# Patient Record
Sex: FEMALE | Race: WHITE | NOT HISPANIC OR LATINO | Employment: OTHER | URBAN - METROPOLITAN AREA
[De-identification: names, ages, dates, MRNs, and addresses within clinical notes are randomized per-mention and may not be internally consistent; named-entity substitution may affect disease eponyms.]

---

## 2017-03-02 DIAGNOSIS — E03.8 OTHER SPECIFIED HYPOTHYROIDISM: ICD-10-CM

## 2017-03-02 DIAGNOSIS — I10 HYPERTENSION GOAL BP (BLOOD PRESSURE) < 140/80: ICD-10-CM

## 2017-03-02 NOTE — TELEPHONE ENCOUNTER
lisinopril      Last Written Prescription Date: 6/20/16  Last Fill Quantity: 90, # refills: 2  Last Office Visit with [facility] or  Health prescribing provider: 8/25/16  Next 5 appointments (look out 90 days)     Apr 14, 2017  2:00 PM CDT   SHORT with Twin Aldridge MD   [facility]    [address]  [facility] [address]   [phone]                   Potassium   Date Value Ref Range Status   08/25/2016 4.0 3.4 - 5.3 mmol/L Final     Creatinine   Date Value Ref Range Status   08/25/2016 0.55 0.52 - 1.04 mg/dL Final     BP Readings from Last 3 Encounters:   08/25/16 114/64   05/27/16 100/62   10/30/15 102/60     verapamil         Last Written Prescription Date: 6/20/16  Last Fill Quantity: 90, # refills: 2    Last Office Visit with [facility], [facility] or  Health prescribing provider:  8/25/16   Future Office Visit:    Next 5 appointments (look out 90 days)     Apr 14, 2017  2:00 PM CDT   SHORT with Twin Aldridge MD   [facility]    [address]  [facility] [address]   [phone]                  BP Readings from Last 3 Encounters:   08/25/16 114/64   05/27/16 100/62   10/30/15 102/60     Lab Results   Component Value Date    ALT 25 08/25/2016     Lab Results   Component Value Date    CHOL 167 08/25/2016     Lab Results   Component Value Date    HDL 59 08/25/2016     Lab Results   Component Value Date    LDL 84 08/25/2016     Lab Results   Component Value Date    TRIG 119 08/25/2016     Lab Results   Component Value Date    CHOLHDLRATIO 2.9 07/09/2015       Labs showing if normal/abnormal  Lab Results   Component Value Date    ALT 25 08/25/2016     Lab Results   Component Value Date    CHOL 167 08/25/2016    TRIG 119 08/25/2016    HDL 59 08/25/2016    LDL 84 08/25/2016    VLDL 26 07/09/2015    CHOLHDLRATIO 2.9 07/09/2015     levo     Last Written Prescription Date: 6/20/16  Last Quantity: 90, # refills:  2  Last Office Visit with [facility], [facility] or [facility] prescribing provider: 8/25/16   Next 5 appointments (look out 90 days)     Apr 14, 2017  2:00 PM CDT   SHORT with Twin Aldridge MD   [facility] ([facility]    [address]  [facility] [address]   [phone]                   TSH   Date Value Ref Range Status   08/25/2016 0.65 0.40 - 4.00 mU/L Final

## 2017-03-07 ENCOUNTER — DOCUMENTATION ONLY (OUTPATIENT)
Dept: LAB | Facility: CLINIC | Age: 67
End: 2017-03-07

## 2017-03-07 DIAGNOSIS — E03.4 HYPOTHYROIDISM DUE TO ACQUIRED ATROPHY OF THYROID: ICD-10-CM

## 2017-03-07 DIAGNOSIS — I10 ESSENTIAL HYPERTENSION: ICD-10-CM

## 2017-03-07 DIAGNOSIS — Z13.6 CARDIOVASCULAR SCREENING; LDL GOAL LESS THAN 130: Primary | ICD-10-CM

## 2017-03-07 RX ORDER — LISINOPRIL 10 MG/1
10 TABLET ORAL DAILY
Qty: 90 TABLET | Refills: 1 | Status: SHIPPED | OUTPATIENT
Start: 2017-03-07 | End: 2017-04-14

## 2017-03-07 RX ORDER — LEVOTHYROXINE SODIUM 137 UG/1
137 TABLET ORAL DAILY
Qty: 90 TABLET | Refills: 1 | Status: SHIPPED | OUTPATIENT
Start: 2017-03-07 | End: 2017-04-14

## 2017-03-07 RX ORDER — VERAPAMIL HYDROCHLORIDE 240 MG/1
240 TABLET, FILM COATED, EXTENDED RELEASE ORAL DAILY
Qty: 90 TABLET | Refills: 1 | Status: SHIPPED | OUTPATIENT
Start: 2017-03-07 | End: 2017-04-14

## 2017-03-07 NOTE — TELEPHONE ENCOUNTER
Prescription approved per [facility] Refill Protocol.  Rosa Adkins RN

## 2017-04-07 DIAGNOSIS — E03.4 HYPOTHYROIDISM DUE TO ACQUIRED ATROPHY OF THYROID: ICD-10-CM

## 2017-04-07 DIAGNOSIS — I10 ESSENTIAL HYPERTENSION: ICD-10-CM

## 2017-04-07 DIAGNOSIS — Z13.6 CARDIOVASCULAR SCREENING; LDL GOAL LESS THAN 130: ICD-10-CM

## 2017-04-07 PROCEDURE — 80053 COMPREHEN METABOLIC PANEL: CPT | Performed by: INTERNAL MEDICINE

## 2017-04-07 PROCEDURE — 36415 COLL VENOUS BLD VENIPUNCTURE: CPT | Performed by: INTERNAL MEDICINE

## 2017-04-07 PROCEDURE — 84443 ASSAY THYROID STIM HORMONE: CPT | Performed by: INTERNAL MEDICINE

## 2017-04-07 PROCEDURE — 80061 LIPID PANEL: CPT | Performed by: INTERNAL MEDICINE

## 2017-04-08 LAB
ALBUMIN SERPL-MCNC: 3.5 G/DL (ref 3.4–5)
ALP SERPL-CCNC: 97 U/L (ref 40–150)
ALT SERPL W P-5'-P-CCNC: 31 U/L (ref 0–50)
ANION GAP SERPL CALCULATED.3IONS-SCNC: 6 MMOL/L (ref 3–14)
AST SERPL W P-5'-P-CCNC: 10 U/L (ref 0–45)
BILIRUB SERPL-MCNC: 0.4 MG/DL (ref 0.2–1.3)
BUN SERPL-MCNC: 20 MG/DL (ref 7–30)
CALCIUM SERPL-MCNC: 8.8 MG/DL (ref 8.5–10.1)
CHLORIDE SERPL-SCNC: 105 MMOL/L (ref 94–109)
CHOLEST SERPL-MCNC: 188 MG/DL
CO2 SERPL-SCNC: 29 MMOL/L (ref 20–32)
CREAT SERPL-MCNC: 0.68 MG/DL (ref 0.52–1.04)
GFR SERPL CREATININE-BSD FRML MDRD: 87 ML/MIN/1.7M2
GLUCOSE SERPL-MCNC: 89 MG/DL (ref 70–99)
HDLC SERPL-MCNC: 66 MG/DL
LDLC SERPL CALC-MCNC: 103 MG/DL
NONHDLC SERPL-MCNC: 122 MG/DL
POTASSIUM SERPL-SCNC: 4.2 MMOL/L (ref 3.4–5.3)
PROT SERPL-MCNC: 6.9 G/DL (ref 6.8–8.8)
SODIUM SERPL-SCNC: 140 MMOL/L (ref 133–144)
TRIGL SERPL-MCNC: 93 MG/DL
TSH SERPL DL<=0.005 MIU/L-ACNC: 1.3 MU/L (ref 0.4–4)

## 2017-04-14 ENCOUNTER — OFFICE VISIT (OUTPATIENT)
Dept: INTERNAL MEDICINE | Facility: CLINIC | Age: 67
End: 2017-04-14
Payer: COMMERCIAL

## 2017-04-14 VITALS
SYSTOLIC BLOOD PRESSURE: 110 MMHG | RESPIRATION RATE: 12 BRPM | DIASTOLIC BLOOD PRESSURE: 60 MMHG | HEART RATE: 80 BPM | HEIGHT: 64 IN | OXYGEN SATURATION: 97 % | TEMPERATURE: 97.6 F | BODY MASS INDEX: 28 KG/M2 | WEIGHT: 164 LBS

## 2017-04-14 DIAGNOSIS — G43.809 MIGRAINE VARIANT: ICD-10-CM

## 2017-04-14 DIAGNOSIS — I10 HYPERTENSION GOAL BP (BLOOD PRESSURE) < 140/80: Primary | ICD-10-CM

## 2017-04-14 DIAGNOSIS — Z80.0 FAMILY HISTORY OF COLON CANCER: ICD-10-CM

## 2017-04-14 DIAGNOSIS — E03.8 OTHER SPECIFIED HYPOTHYROIDISM: ICD-10-CM

## 2017-04-14 DIAGNOSIS — G25.81 RESTLESS LEG SYNDROME: ICD-10-CM

## 2017-04-14 PROCEDURE — 99214 OFFICE O/P EST MOD 30 MIN: CPT | Performed by: INTERNAL MEDICINE

## 2017-04-14 RX ORDER — LEVOTHYROXINE SODIUM 137 UG/1
137 TABLET ORAL DAILY
Qty: 90 TABLET | Refills: 3 | Status: SHIPPED | OUTPATIENT
Start: 2017-04-14 | End: 2017-08-24

## 2017-04-14 RX ORDER — VERAPAMIL HYDROCHLORIDE 240 MG/1
240 TABLET, FILM COATED, EXTENDED RELEASE ORAL DAILY
Qty: 90 TABLET | Refills: 3 | Status: SHIPPED | OUTPATIENT
Start: 2017-04-14 | End: 2017-08-24

## 2017-04-14 RX ORDER — LISINOPRIL 10 MG/1
10 TABLET ORAL DAILY
Qty: 90 TABLET | Refills: 3 | Status: SHIPPED | OUTPATIENT
Start: 2017-04-14 | End: 2017-08-24

## 2017-04-14 ASSESSMENT — ANXIETY QUESTIONNAIRES
3. WORRYING TOO MUCH ABOUT DIFFERENT THINGS: NOT AT ALL
2. NOT BEING ABLE TO STOP OR CONTROL WORRYING: NOT AT ALL
IF YOU CHECKED OFF ANY PROBLEMS ON THIS QUESTIONNAIRE, HOW DIFFICULT HAVE THESE PROBLEMS MADE IT FOR YOU TO DO YOUR WORK, TAKE CARE OF THINGS AT HOME, OR GET ALONG WITH OTHER PEOPLE: NOT DIFFICULT AT ALL
7. FEELING AFRAID AS IF SOMETHING AWFUL MIGHT HAPPEN: NOT AT ALL
GAD7 TOTAL SCORE: 0
5. BEING SO RESTLESS THAT IT IS HARD TO SIT STILL: NOT AT ALL
1. FEELING NERVOUS, ANXIOUS, OR ON EDGE: NOT AT ALL
6. BECOMING EASILY ANNOYED OR IRRITABLE: NOT AT ALL

## 2017-04-14 ASSESSMENT — PATIENT HEALTH QUESTIONNAIRE - PHQ9: 5. POOR APPETITE OR OVEREATING: NOT AT ALL

## 2017-04-14 NOTE — MR AVS SNAPSHOT
After Visit Summary   4/14/2017    China Patterson    MRN: [id number]           Patient Information     Date Of Birth          1950        Visit Information        Provider Department      4/14/2017 2:00 PM Twin Aldridge MD [facility]        Today's Diagnoses     Hypertension goal BP (blood pressure) < 140/80    -  1    Other specified hypothyroidism        Family history of colon cancer        Restless leg syndrome        Migraine variant          Care Instructions    Recent labs all look fine.     Refills of blood pressure and thyroid meds have been sent to [facility] for a year.     Someone will contact you to schedule a colonoscopy.     See Nydia Jane for your annual visit with her.     See me in a year.             Follow-ups after your visit        Additional Services     GASTROENTEROLOGY ADULT REF PROCEDURE ONLY       Last Lab Result: Creatinine (mg/dL)       Date                     Value                 04/07/2017               0.68             ----------  Body mass index is 28.6 kg/(m^2).      Patient will be contacted to schedule procedure.     Please be aware that coverage of these services is subject to the terms and limitations of your health insurance plan.  Call member services at your health plan with any benefit or coverage questions.  Any procedures must be performed at a [facility] facility OR coordinated by your clinic's referral office.    Please bring the following with you to your appointment:    (1) Any X-Rays, CTs or MRIs which have been performed.  Contact the facility where they were done to arrange for  prior to your scheduled appointment.    (2) List of current medications   (3) This referral request   (4) Any documents/labs given to you for this referral                  Who to contact     If you have questions or need follow up information about today's clinic visit or your schedule please contact [facility] directly at  "[phone].  Normal or non-critical lab and imaging results will be communicated to you by [email or web address], letter or phone within 4 business days after the clinic has received the results. If you do not hear from us within 7 days, please contact the clinic through [email or web address] or phone. If you have a critical or abnormal lab result, we will notify you by phone as soon as possible.  Submit refill requests through [facility] or call your pharmacy and they will forward the refill request to us. Please allow 3 business days for your refill to be completed.          Additional Information About Your Visit        [email or web address] Information     [facility] lets you send messages to your doctor, view your test results, renew your prescriptions, schedule appointments and more. To sign up, go to [email or web address] . Click on \"Log in\" on the left side of the screen, which will take you to the Welcome page. Then click on \"Sign up Now\" on the right side of the page.     You will be asked to enter the access code listed below, as well as some personal information. Please follow the directions to create your username and password.     Your access code is: [email or web address]  Expires: 2017  2:41 PM     Your access code will  in 90 days. If you need help or a new code, please call your [place] clinic or [phone].        Care EveryWhere ID     This is your Care EveryWhere ID. This could be used by other organizations to access your [place] medical records  [id number]        Your Vitals Were     Pulse Temperature Respirations Height Last Period Pulse Oximetry    80 97.6  F (36.4  C) (Oral) 12 5' 3.5\" (1.613 m) 2003 97%    Breastfeeding? BMI (Body Mass Index)                No 28.6 kg/m2           Blood Pressure from Last 3 Encounters:   17 110/60   16 114/64   16 100/62    Weight from Last 3 Encounters:   17 164 lb (74.4 kg)   16 158 lb (71.7 kg)   16 157 lb 6.4 oz (71.4 kg)              We " Performed the Following     GASTROENTEROLOGY ADULT REF PROCEDURE ONLY          Where to get your medicines      These medications were sent to [id number] - [place], [place] - [address]  [address], [place] [address]     Phone:  [phone]     levothyroxine 137 MCG tablet    lisinopril 10 MG tablet    verapamil 240 MG CR tablet          Primary Care Provider Office Phone # Fax #    Twin Aldridge [phone]       [facility] [address]  [facility] [address]        Thank you!     Thank you for choosing [facility]  for your care. Our goal is always to provide you with excellent care. Hearing back from our patients is one way we can continue to improve our services. Please take a few minutes to complete the written survey that you may receive in the mail after your visit with us. Thank you!             Your Updated Medication List - Protect others around you: Learn how to safely use, store and throw away your medicines at www.disposemymeds.org.          This list is accurate as of: 4/14/17  2:41 PM.  Always use your most recent med list.                   Brand Name Dispense Instructions for use    aspirin 81 MG tablet     100    ONE DAILY       desonide 0.05 % cream    DESOWEN    30 g    Apply  topically 2 times daily. May be used on external ears, face and neck.       levothyroxine 137 MCG tablet    SYNTHROID/LEVOTHROID    90 tablet    Take 1 tablet (137 mcg) by mouth daily Note dosing change.       lisinopril 10 MG tablet    PRINIVIL/ZESTRIL    90 tablet    Take 1 tablet (10 mg) by mouth daily       pantoprazole 40 MG EC tablet    PROTONIX    90 tablet    Take 1 tablet (40 mg) by mouth daily Take 30-60 minutes before a meal.       pramipexole 0.125 MG tablet    MIRAPEX    120 tablet    Take 2 tablets at bedtime. May raise dose by one tab every 4-7 days if needed, until symptoms better or maximum of 4 tabs qhs.       SUMAtriptan  100 MG tablet    IMITREX    9 tablet    Take 1 tablet (100 mg) by mouth at onset of headache for migraine May repeat in 2 hours if needed.       terbinafine 250 MG tablet    lamISIL    42 tablet    Take 1 tablet (250 mg) by mouth daily       triamcinolone 0.1 % cream    KENALOG    80 g    Apply topically 2 times daily       verapamil 240 MG CR tablet    CALAN-SR    90 tablet    Take 1 tablet (240 mg) by mouth daily

## 2017-04-14 NOTE — PATIENT INSTRUCTIONS
Recent labs all look fine.     Refills of blood pressure and thyroid meds have been sent to [facility] for a year.     Someone will contact you to schedule a colonoscopy.     See Nydia Jane for your annual visit with her.     See me in a year.

## 2017-04-14 NOTE — PROGRESS NOTES
SUBJECTIVE:                                                    China Patterson is a 66 year old female who presents to clinic today for the following health issues:  Hypothyroidism, hypertension, GERD, migraine headaches, restless legs, health maintenance issues.     Patient presents to office for check up on hypothyroidism. Recent TSH in normal range. No temperature intolerance, changes with skin or hair, no changes in bowel habits.     Her BP appears satisfactorily controlled on current meds. The patient is tolerating her current medications without any adverse effects.     She uses Imitrex very infrequently for migraines.   She takes Protonix prn for heartburn. No dysphagia or odynophagia.     She does well on Mirapex which controls restless leg symptoms.     We reviewed recent labs which looked favorable.     Mammograms: Patient has annual mammograms ordered by Dr. Jane of OB-GYN.     Colonoscopy: Last procedure was in 2012. Patient is due for colonoscopy this year    Patient has no health concerns at this time.       Amount of exercise or physical activity: 6-7 days/week for an average of 30-45 minutes    Problems taking medications regularly: No    Medication side effects: none    Diet: regular (no restrictions)    Problem list and histories reviewed & adjusted, as indicated.  Additional history: as documented    BP Readings from Last 3 Encounters:   04/14/17 110/60   08/25/16 114/64   05/27/16 100/62    Wt Readings from Last 3 Encounters:   04/14/17 74.4 kg (164 lb)   08/25/16 71.7 kg (158 lb)   05/27/16 71.4 kg (157 lb 6.4 oz)        Labs reviewed in EPIC    Reviewed and updated as needed this visit by clinical staff  Tobacco  Allergies  Meds  Med Hx  Surg Hx  Fam Hx  Soc Hx      Reviewed and updated as needed this visit by Provider    ROS:  E: NEGATIVE for vision changes or irritation  R: NEGATIVE for significant cough or SOB   CV: NEGATIVE for chest pain, palpitations or peripheral edema   GI:  "NEGATIVE for nausea, abdominal pain, heartburn, or change in bowel habits   N: NEGATIVE for weakness, dizziness or paresthesias, abrupt changes in speech, severe and/or frequent headaches   E: NEGATIVE for temperature intolerance, skin/hair changes    OBJECTIVE:                                                    /60 (BP Location: Left arm, Patient Position: Chair, Cuff Size: Adult Large)  Pulse 80  Temp 97.6  F (36.4  C) (Oral)  Resp 12  Ht 1.613 m (5' 3.5\")  Wt 74.4 kg (164 lb)  LMP 12/01/2003  SpO2 97%  Breastfeeding? No  BMI 28.6 kg/m2  Body mass index is 28.6 kg/(m^2).  GENERAL: healthy, alert and no distress  EYES: Eyes grossly normal to inspection, PERRL and conjunctivae and sclerae normal  RESP: lungs clear to auscultation - no rales, rhonchi or wheezes  CV: regular rate and rhythm, normal S1 S2, no S3 or S4, no murmur, click or rub, no peripheral edema and peripheral pulses strong  NEURO: Normal strength and tone, mentation intact and speech normal  PSYCH: mentation appears normal, affect normal/bright         ASSESSMENT/PLAN:                                                    (I10) Hypertension goal BP (blood pressure) < 140/80  (primary encounter diagnosis)  Comment: BP at target. Continue current meds.  Plan: lisinopril (PRINIVIL/ZESTRIL) 10 MG tablet,         verapamil (CALAN-SR) 240 MG CR tablet            (E03.8) Other specified hypothyroidism  Comment: TSH normal level. Continue current meds  Plan: levothyroxine (SYNTHROID/LEVOTHROID) 137 MCG         tablet            (Z80.0) Family history of colon cancer  She is due for colonoscopy and this is ordered.   Plan: GASTROENTEROLOGY ADULT REF PROCEDURE ONLY            (G25.81) Restless leg syndrome  Comment: Continue use of medication as directed  Plan: pramipexole (MIRAPEX) .125 MG tablet    (G43.809) Migraine variant  Comment: Use medication PRN  Plan: SUMAtripan (IMITREX) 100 MG tablet    Patient Instructions   Recent labs all look fine. "     Refills of blood pressure and thyroid meds have been sent to [facility] for a year.     Someone will contact you to schedule a colonoscopy.     See Nydia Buck for your annual visit with her.     See me in a year.     Twin Aldridge MD  [facility]    This document serves as a record of the services and decisions personally performed and made by Twin Aldridge MD. It was created on their behalf by Tonya Styles, a trained medical scribe. The creation of this document is based the provider's statements to the medical scribe.  Tonya Styles April 14, 2017 2:33 PM

## 2017-04-14 NOTE — NURSING NOTE
"Chief Complaint   Patient presents with     Follow Up For     thyroid       Initial /60 (BP Location: Left arm, Patient Position: Chair, Cuff Size: Adult Large)  Pulse 80  Temp 97.6  F (36.4  C) (Oral)  Resp 12  Ht 5' 3.5\" (1.613 m)  Wt 164 lb (74.4 kg)  LMP 12/01/2003  SpO2 97%  Breastfeeding? No  BMI 28.6 kg/m2 Estimated body mass index is 28.6 kg/(m^2) as calculated from the following:    Height as of this encounter: 5' 3.5\" (1.613 m).    Weight as of this encounter: 164 lb (74.4 kg).  Medication Reconciliation: complete   Lindy ESPARZA      "

## 2017-04-14 NOTE — LETTER
"[facility]  [address] [place] [address]  [phone]    4/14/2017    China Patterson  [address]  [place] [address]           Dear Ms. Patterson,    The results of your lab tests are enclosed. Everything looks fine. Unless noted otherwise below, any results that are outside the \"normal\" range are within acceptable limits and are of no concern.    LDL= Bad Cholesterol-- the target is below 130.     HDL= Good Cholesterol-- although this is determined mostly by heredity, exercise and/or medications may sometimes raise this number.    Triglycerides are another type of fat in the blood, and can sometimes be lowered by reducing intake of sweets or excess carbohydrates, alcohol, and by weight reduction if needed.  Sometimes medications are also used.    AST and ALT are liver tests, as are the bilirubin (total and direct), albumin, total protein, and alkaline phosphatase. Yours are all normal.     Urea Nitrogen and Creatinine are kidney tests--yours are normal. GFR stands for Glomerular Filtration Rate, a more precise estimate of kidney function.    Sodium, Potassium, Chloride, Carbon Dioxide, and Calcium are all normal salts in the bloodstream. Yours all look normal. Your glucose (blood sugar) also looks fine. (You can ignore the anion gap result).    TSH measures thyroid function. Yours is normal.    If you have any further questions or problems, please contact our office.    Sincerely,      RACH ASCENCIO M.D.  Attachment: Lab results     "

## 2017-04-15 ASSESSMENT — ANXIETY QUESTIONNAIRES: GAD7 TOTAL SCORE: 0

## 2017-04-15 ASSESSMENT — PATIENT HEALTH QUESTIONNAIRE - PHQ9: SUM OF ALL RESPONSES TO PHQ QUESTIONS 1-9: 3

## 2017-07-22 ENCOUNTER — HEALTH MAINTENANCE LETTER (OUTPATIENT)
Age: 67
End: 2017-07-22

## 2017-08-08 ENCOUNTER — TELEPHONE (OUTPATIENT)
Dept: INTERNAL MEDICINE | Facility: CLINIC | Age: 67
End: 2017-08-08

## 2017-08-08 NOTE — TELEPHONE ENCOUNTER
Patient calling asking for number to call and schedule her colonoscopy. Gave number to patient to schedule with [facility]. Advised referral was placed 4/14/17 and should be good for 1 year.

## 2017-08-15 ENCOUNTER — HOSPITAL ENCOUNTER (OUTPATIENT)
Dept: MAMMOGRAPHY | Facility: CLINIC | Age: 67
Discharge: HOME | End: 2017-08-15
Attending: OBSTETRICS & GYNECOLOGY | Admitting: OBSTETRICS & GYNECOLOGY
Payer: MEDICARE

## 2017-08-15 DIAGNOSIS — Z12.31 VISIT FOR SCREENING MAMMOGRAM: ICD-10-CM

## 2017-08-15 PROCEDURE — 77063 BREAST TOMOSYNTHESIS BI: CPT

## 2017-08-15 PROCEDURE — G0202 SCR MAMMO BI INCL CAD: HCPCS

## 2017-08-17 ENCOUNTER — HOSPITAL ENCOUNTER (OUTPATIENT)
Dept: MAMMOGRAPHY | Facility: CLINIC | Age: 67
Discharge: HOME | End: 2017-08-17
Attending: OBSTETRICS & GYNECOLOGY | Admitting: OBSTETRICS & GYNECOLOGY
Payer: MEDICARE

## 2017-08-17 DIAGNOSIS — R92.8 ABNORMAL MAMMOGRAM: ICD-10-CM

## 2017-08-17 PROCEDURE — G0206 DX MAMMO INCL CAD UNI: HCPCS

## 2017-08-23 ENCOUNTER — HOSPITAL ENCOUNTER (OUTPATIENT)
Dept: MAMMOGRAPHY | Facility: CLINIC | Age: 67
End: 2017-08-23
Attending: OBSTETRICS & GYNECOLOGY
Payer: MEDICARE

## 2017-08-23 ENCOUNTER — HOSPITAL ENCOUNTER (OUTPATIENT)
Dept: MAMMOGRAPHY | Facility: CLINIC | Age: 67
Discharge: HOME | End: 2017-08-23
Attending: OBSTETRICS & GYNECOLOGY | Admitting: OBSTETRICS & GYNECOLOGY
Payer: MEDICARE

## 2017-08-23 DIAGNOSIS — R92.0 BREAST MICROCALCIFICATION, MAMMOGRAPHIC: ICD-10-CM

## 2017-08-23 PROCEDURE — 19081 BX BREAST 1ST LESION STRTCTC: CPT | Mod: LT

## 2017-08-23 PROCEDURE — 25000125 ZZHC RX 250: Performed by: OBSTETRICS & GYNECOLOGY

## 2017-08-23 PROCEDURE — 88305 TISSUE EXAM BY PATHOLOGIST: CPT | Mod: 26 | Performed by: OBSTETRICS & GYNECOLOGY

## 2017-08-23 PROCEDURE — 40000986 MA POST PROCEDURE LEFT

## 2017-08-23 PROCEDURE — 88305 TISSUE EXAM BY PATHOLOGIST: CPT | Performed by: OBSTETRICS & GYNECOLOGY

## 2017-08-23 RX ORDER — LIDOCAINE HYDROCHLORIDE AND EPINEPHRINE 10; 10 MG/ML; UG/ML
1 INJECTION, SOLUTION INFILTRATION; PERINEURAL ONCE
Status: COMPLETED | OUTPATIENT
Start: 2017-08-23 | End: 2017-08-23

## 2017-08-23 RX ADMIN — LIDOCAINE HYDROCHLORIDE 6 ML: 10 INJECTION, SOLUTION INFILTRATION; PERINEURAL at 09:15

## 2017-08-23 RX ADMIN — LIDOCAINE HYDROCHLORIDE,EPINEPHRINE BITARTRATE 12 ML: 10; .01 INJECTION, SOLUTION INFILTRATION; PERINEURAL at 09:15

## 2017-08-23 NOTE — DISCHARGE INSTRUCTIONS
After Your Breast Biopsy    Bleeding or bruising: Slight bruising is normal.  If you bleed through the bandage, put direct pressure on the breast.  If you are still bleeding after 20 minutes, call the doctor who ordered the exam.    Bandages: Keep your bandage in place until tomorrow morning.  Do not get it wet.  Leave the tape in place for two days.  On the second day, cover it with a Band-Aid.    Activity: You may shower the morning after the exam.  No heavy activity (lifting, vacuuming) for 24 hours.    Discomfort: Wear your bra overnight to support the breast.  You may take Tylenol (acetaminophen) for pain.  If you had a stereotactic of MR-directed biopsy, you may take aspirin or ibuprofen (Advil, Motrin) the morning after your biopsy, unless your doctor tells you not to.    Infection: Infection is rare.  Symptoms include fever, redness, increasing pain and fluid draining from the biopsy site.  If you have any of these symptoms, please call the doctor who ordered your exam.    Results: Results may take up to three business days.  If you have not heard your results in three days, call the Breast Center Nurse at [phone] In rare cases, we may need to do another biopsy.    Call the doctor who ordered your exam if:    You have bleeding that lasts more than 20 minutes.    You have pain that cannot be controlled.    You have signs of infection (fever, redness, drainage or other signs).    You have not had your results within three days.    Nurse navigator: Our nurse navigator is here to answer your questions and help you set up future clinic visits.  Please call [phone].    Thank you for choosing [facility].  Please call us if you have questions or concerns about your biopsy.-

## 2017-08-24 ENCOUNTER — TELEPHONE (OUTPATIENT)
Dept: MAMMOGRAPHY | Facility: CLINIC | Age: 67
End: 2017-08-24

## 2017-08-24 DIAGNOSIS — E03.8 OTHER SPECIFIED HYPOTHYROIDISM: ICD-10-CM

## 2017-08-24 DIAGNOSIS — K21.9 ESOPHAGEAL REFLUX: ICD-10-CM

## 2017-08-24 DIAGNOSIS — I10 HYPERTENSION GOAL BP (BLOOD PRESSURE) < 140/80: ICD-10-CM

## 2017-08-24 DIAGNOSIS — G43.909 MIGRAINE WITHOUT STATUS MIGRAINOSUS, NOT INTRACTABLE, UNSPECIFIED MIGRAINE TYPE: ICD-10-CM

## 2017-08-24 LAB — COPATH REPORT: NORMAL

## 2017-08-24 RX ORDER — VERAPAMIL HYDROCHLORIDE 240 MG/1
240 TABLET, FILM COATED, EXTENDED RELEASE ORAL DAILY
Qty: 90 TABLET | Refills: 0 | Status: SHIPPED | OUTPATIENT
Start: 2017-08-24

## 2017-08-24 RX ORDER — SUMATRIPTAN 100 MG/1
100 TABLET, FILM COATED ORAL
Qty: 27 TABLET | Refills: 0 | Status: SHIPPED | OUTPATIENT
Start: 2017-08-24 | End: 2017-12-03

## 2017-08-24 RX ORDER — LISINOPRIL 10 MG/1
10 TABLET ORAL DAILY
Qty: 90 TABLET | Refills: 0 | Status: SHIPPED | OUTPATIENT
Start: 2017-08-24

## 2017-08-24 RX ORDER — LEVOTHYROXINE SODIUM 137 UG/1
137 TABLET ORAL DAILY
Qty: 90 TABLET | Refills: 0 | Status: SHIPPED | OUTPATIENT
Start: 2017-08-24

## 2017-08-24 RX ORDER — PANTOPRAZOLE SODIUM 40 MG/1
40 TABLET, DELAYED RELEASE ORAL DAILY
Qty: 90 TABLET | Refills: 0 | Status: SHIPPED | OUTPATIENT
Start: 2017-08-24 | End: 2017-12-18

## 2017-08-24 NOTE — TELEPHONE ENCOUNTER
Pathology report reviewed with breast radiologist Bob. I phoned and informed patient of results showing papilloma. Recommended follow up is surgical consult and excision.  She has bee set up to meet with Dr Sood on 8-28-17 at 1430 arriving at 1400.    Patient states no problems with biopsy site. Questions were answered and my phone number given if she has further questions or concerns.

## 2017-08-24 NOTE — TELEPHONE ENCOUNTER
Pt wasn't able to get an appt until Nov, and her meds will run out before then.  Apparently her pharmacy is only honor the meds for 6 months even though they were written for a year.  Sent refills until appt.

## 2017-08-28 ENCOUNTER — OFFICE VISIT (OUTPATIENT)
Dept: SURGERY | Facility: CLINIC | Age: 67
End: 2017-08-28
Payer: COMMERCIAL

## 2017-08-28 VITALS
SYSTOLIC BLOOD PRESSURE: 122 MMHG | HEART RATE: 79 BPM | WEIGHT: 155 LBS | OXYGEN SATURATION: 94 % | HEIGHT: 64 IN | BODY MASS INDEX: 26.46 KG/M2 | DIASTOLIC BLOOD PRESSURE: 62 MMHG

## 2017-08-28 DIAGNOSIS — N63.0 BREAST MASS: Primary | ICD-10-CM

## 2017-08-28 DIAGNOSIS — N63.20 LEFT BREAST MASS: Primary | ICD-10-CM

## 2017-08-28 PROCEDURE — 99203 OFFICE O/P NEW LOW 30 MIN: CPT | Performed by: SURGERY

## 2017-08-28 NOTE — MR AVS SNAPSHOT
"              After Visit Summary   8/28/2017    China Patterson    MRN: [id number]           Patient Information     Date Of Birth          1950        Visit Information        Provider Department      8/28/2017 2:30 PM Jose Sood MD [facility] General Surgery      Today's Diagnoses     Breast mass    -  1       Follow-ups after your visit        Your next 10 appointments already scheduled     Sep 08, 2017 11:15 AM CDT   [facility] Same Day Surgery with Jose Sood MD   [facility])    [facility])   [phone]            Nov 07, 2017  8:20 AM CST   SHORT with Twin Aldridge MD   [facility] ([facility])    [address]  [facility] [address] [phone]              Who to contact     If you have questions or need follow up information about today's clinic visit or your schedule please contact [facility] directly at [phone].  Normal or non-critical lab and imaging results will be communicated to you by [employer], letter or phone within 4 business days after the clinic has received the results. If you do not hear from us within 7 days, please contact the clinic through MySocialNightlifet or phone. If you have a critical or abnormal lab result, we will notify you by phone as soon as possible.  Submit refill requests through Sumerian or call your pharmacy and they will forward the refill request to us. Please allow 3 business days for your refill to be completed.          Additional Information About Your Visit        [employer] Information     Sumerian lets you send messages to your doctor, view your test results, renew your prescriptions, schedule appointments and more. To sign up, go to [email or web address] . Click on \"Log in\" on the left side of the screen, which will take " "you to the Welcome page. Then click on \"Sign up Now\" on the right side of the page.     You will be asked to enter the access code listed below, as well as some personal information. Please follow the directions to create your username and password.     Your access code is: [email or web address]  Expires: 2017  2:50 PM     Your access code will  in 90 days. If you need help or a new code, please call your [place] clinic or [phone].        Care EveryWhere ID     This is your Care EveryWhere ID. This could be used by other organizations to access your [place] medical records  [id number]        Your Vitals Were     Pulse Height Last Period Pulse Oximetry BMI (Body Mass Index)       79 1.626 m (5' 4\") 2003 94% 26.61 kg/m2        Blood Pressure from Last 3 Encounters:   17 122/62   17 110/60   16 114/64    Weight from Last 3 Encounters:   17 70.3 kg (155 lb)   17 74.4 kg (164 lb)   16 71.7 kg (158 lb)              Today, you had the following     No orders found for display       Primary Care Provider Office Phone # Fax #    Twin Aldridge [phone]       [address]  [facility] [address]        Equal Access to Services     : Hadii aad ku hadasho Sochloeali, waaxda luqadaha, qaybta kaalmada adeegyada, graciela rasheed . So [facility] [phone].    ATENCIÓN: Si habla español, tiene a barton disposición servicios gratuitos de asistencia lingüística. Llame al [phone].    We comply with applicable federal civil rights laws and [place] laws. We do not discriminate on the basis of race, color, national origin, age, disability sex, sexual orientation or gender identity.            Thank you!     Thank you for choosing [facility]  for your care. Our goal is always to provide you with excellent care. Hearing back from our patients is one way we can continue to improve our services. Please " take a few minutes to complete the written survey that you may receive in the mail after your visit with us. Thank you!             Your Updated Medication List - Protect others around you: Learn how to safely use, store and throw away your medicines at www.disposemymeds.org.          This list is accurate as of: 8/28/17  2:50 PM.  Always use your most recent med list.                   Brand Name Dispense Instructions for use Diagnosis    aspirin 81 MG tablet     100    ONE DAILY    Unspecified essential hypertension       desonide 0.05 % cream    DESOWEN    30 g    Apply  topically 2 times daily. May be used on external ears, face and neck.    Dermatitis       levothyroxine 137 MCG tablet    SYNTHROID/LEVOTHROID    90 tablet    Take 1 tablet (137 mcg) by mouth daily Note dosing change.    Other specified hypothyroidism       lisinopril 10 MG tablet    PRINIVIL/ZESTRIL    90 tablet    Take 1 tablet (10 mg) by mouth daily    Hypertension goal BP (blood pressure) < 140/80       pantoprazole 40 MG EC tablet    PROTONIX    90 tablet    Take 1 tablet (40 mg) by mouth daily Take 30-60 minutes before a meal.    Esophageal reflux       pramipexole 0.125 MG tablet    MIRAPEX    120 tablet    Take 2 tablets at bedtime. May raise dose by one tab every 4-7 days if needed, until symptoms better or maximum of 4 tabs qhs.    Restless legs syndrome (RLS)       SUMAtriptan 100 MG tablet    IMITREX    27 tablet    Take 1 tablet (100 mg) by mouth at onset of headache for migraine May repeat in 2 hours if needed.    Migraine without status migrainosus, not intractable, unspecified migraine type       terbinafine 250 MG tablet    lamISIL    42 tablet    Take 1 tablet (250 mg) by mouth daily    Onychomycosis       triamcinolone 0.1 % cream    KENALOG    80 g    Apply topically 2 times daily    Progressive pigmentary dermatosis of Schamberg       verapamil 240 MG CR tablet    CALAN-SR    90 tablet    Take 1 tablet (240 mg) by mouth daily     Hypertension goal BP (blood pressure) < 140/80

## 2017-08-28 NOTE — PROGRESS NOTES
HPI:  left breast calcifications noted on screening mammogram 8/15/2017  Diagnostic mammogram showed amorphous calcifications at 0100, 8 cm FN spanning 0.3 cm  Skin rashes, dimpling or nipple changes:  none  Nipple discharge:   none  Does perform self breast exams.  Previous breast biopsies: No  Previous cyst aspiration: No  Previous other breast surgery: No    Family History   Problem Relation Age of Onset     Cancer - colorectal Father       age 63     HEART DISEASE Paternal Grandmother      Heart Failure     CEREBROVASCULAR DISEASE Paternal Grandmother      HEART DISEASE Paternal Grandfather      MI fatal age 60     HEART DISEASE Mother      Mild heart attack in , otherwise quite healthy, born in 1923     Cardiovascular Mother      Alzheimer Disease Mother 83      age 89     Neurologic Disorder Sister      Migraine headaches     Family History Negative Brother      Born 1947     Family history of breast cancer: No  Family history of colon cancer: Yes - father  Family history of pancreatic cancer: No  Family history of prostate cancer: No    Mammography reveals: microcalcifications measuring 0.3 cm at the 1 o'clock position and 8 cm from the nipple.    Percutaneous core needle biopsy reveals: an intraductal papilloma     Past Medical History:   has a past medical history of Chronic rhinitis; Unspecified essential hypertension; Unspecified hypothyroidism; and Variants of migraine, not elsewhere classified, without mention of intractable migraine without mention of status migrainosus.    Past Surgical History:  Past Surgical History:   Procedure Laterality Date     C NONSPECIFIC PROCEDURE  2009    Breast biopsy--benign     COLONOSCOPY  2012    Mild changes of collagenous colitis     COLONOSCOPY  2009    Normal     HC COLONOSCOPY THRU STOMA, DIAGNOSTIC  2003    No polyps.      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        PE:  Vitals: LMP 12/01/2003  General appearance: well-nourished, sitting comfortably, no apparent distress  Neck: Supple without thyromegaly, lymphadenopathy, masses  Lungs: Respirations unlabored  Abdomen: soft, nondistended  Extremities: Without edema  Neurologic: nonfocal, grossly intact times four extremities, alert and oriented times three  Psychiatric: Mood and affect are appropriate  Skin: Without lesions or rashes  Breast:     Masses- none    Ecchymosis- left uoq indurated, also ecchymotic nipple   Incisional scar- none    Axillae:   Palpable adenopathy: none    Assessment: Left breast papilloma     PLAN:  Discussed options for close observation with self breast exams, follow up left mammography, left breast ultrasound and MRI and physical exam vs seed localized excisional biopsy.  China elects excisional biopsy. Discussed surgery in detail, incision, scar, additional surgery , post op care and activity    Jose Sood MD    Please route or send letter to:  Primary Care Provider (PCP) and Include Progress Note

## 2017-08-28 NOTE — LETTER
2017    RE:  China Patterson-:  [birth year]    HPI:  left breast calcifications noted on screening mammogram 8/15/2017  Diagnostic mammogram showed amorphous calcifications at 0100, 8 cm FN spanning 0.3 cm  Skin rashes, dimpling or nipple changes:  none  Nipple discharge:   none  Does perform self breast exams.  Previous breast biopsies: No  Previous cyst aspiration: No  Previous other breast surgery: No     Family history of breast cancer: No  Family history of colon cancer: Yes - father  Family history of pancreatic cancer: No  Family history of prostate cancer: No     Mammography reveals: microcalcifications measuring 0.3 cm at the 1 o'clock position and 8 cm from the nipple.     Percutaneous core needle biopsy reveals: an intraductal papilloma      Past Medical History:  Has a past medical history of Chronic rhinitis; Unspecified essential hypertension; Unspecified hypothyroidism; and Variants of migraine, not elsewhere classified, without mention of intractable migraine without mention of status migrainosus.     PE:  Vitals: LMP 2003  General appearance: well-nourished, sitting comfortably, no apparent distress  Neck: Supple without thyromegaly, lymphadenopathy, masses  Lungs: Respirations unlabored  Abdomen: soft, nondistended  Extremities: Without edema  Neurologic: nonfocal, grossly intact times four extremities, alert and oriented times three  Psychiatric: Mood and affect are appropriate  Skin: Without lesions or rashes  Breast:                          Masses- none                         Ecchymosis- left uoq indurated, also ecchymotic nipple                        Incisional scar- none     Axillae:   Palpable adenopathy: none     Assessment: Left breast papilloma      PLAN:  Discussed options for close observation with self breast exams, follow up left mammography, left breast ultrasound and MRI and physical exam vs seed localized excisional biopsy.  China elects excisional biopsy.  Discussed surgery in detail, incision, scar, additional surgery , post op care and activity     Jose Sood MD

## 2017-08-28 NOTE — PROGRESS NOTES
Breast Patients    BREAST PATIENTS (ALL)    1-Do you have any of the following symptoms? Other: none  2-In which breast are you having the symptoms?   3-Do you use hormones?  No  4-Have you had a Mammogram? Yes  Where:  breast center  Date: 8/22/17  5-Have you ever had a breast cyst drained? No  6-Have you ever had a breast biopsy? Yes  Side: L  Date: 8/22/17  R breast biopsy yrs ago  7-Have you ever had a Breast Cancer? No   8-Is there a history of Breast Cancer in your family? No  9-Have you ever had Ovarian Cancer? No  10-Is there a history of Ovarian Cancer in your family? No  11-Summarize your caffeine intake (i.e. coffee, tea, chocolate, soda etc.): 1 soda daily    BREAST PATIENTS (FEMALE)    12-What age did your periods begin? 13  13-Date your last menstrual period began? ??  14-Number of full-term pregnancies: 2  15-Your age when your first child was born? 20  16-Did you nurse your children? No  17-Are you pregnant now? No  18-Have you begun menopause? Yes  Age Menopause began:  ??  19-Have you had either ovary removed?No  20-Do you have breast implants? No       HPI      ROS (Review of Systems):      Positive for System Review.  System Review has been done        Physical Exam

## 2017-09-06 ENCOUNTER — OFFICE VISIT (OUTPATIENT)
Dept: INTERNAL MEDICINE | Facility: CLINIC | Age: 67
End: 2017-09-06
Payer: COMMERCIAL

## 2017-09-06 VITALS
SYSTOLIC BLOOD PRESSURE: 118 MMHG | BODY MASS INDEX: 27.74 KG/M2 | WEIGHT: 162.5 LBS | HEART RATE: 88 BPM | TEMPERATURE: 97.9 F | HEIGHT: 64 IN | DIASTOLIC BLOOD PRESSURE: 80 MMHG | OXYGEN SATURATION: 99 %

## 2017-09-06 DIAGNOSIS — J01.90 ACUTE NON-RECURRENT SINUSITIS, UNSPECIFIED LOCATION: Primary | ICD-10-CM

## 2017-09-06 PROCEDURE — 99213 OFFICE O/P EST LOW 20 MIN: CPT | Performed by: FAMILY MEDICINE

## 2017-09-06 RX ORDER — PREDNISONE 10 MG/1
TABLET ORAL
Qty: 12 TABLET | Refills: 0 | Status: SHIPPED | OUTPATIENT
Start: 2017-09-06 | End: 2017-10-23

## 2017-09-06 RX ORDER — CYCLOSPORINE 0.5 MG/ML
EMULSION OPHTHALMIC
COMMUNITY
Start: 2017-07-28 | End: 2024-09-12

## 2017-09-06 RX ORDER — CEFUROXIME AXETIL 500 MG/1
500 TABLET ORAL 2 TIMES DAILY
Qty: 14 TABLET | Refills: 1 | Status: SHIPPED | OUTPATIENT
Start: 2017-09-06 | End: 2017-09-13

## 2017-09-06 NOTE — MR AVS SNAPSHOT
After Visit Summary   9/6/2017    China Patterson    MRN: [id number]           Patient Information     Date Of Birth          1950        Visit Information        Provider Department      9/6/2017 8:40 AM Hunter Beltrán MD [facility]        Today's Diagnoses     Acute non-recurrent sinusitis, unspecified location    -  1      Care Instructions      Take prescribed medication as directed.    OK to also use generic Zyrtec.          Follow-ups after your visit        Your next 10 appointments already scheduled     Oct 24, 2017  9:20 AM CDT   Pre-Op physical with Becka Maldonado MD   [facility] ([facility])    [address]  [facility] [address]   [phone]            Oct 26, 2017  8:00 AM CDT   MA RADIOACTIVE SEED LOCALIZATION LEFT with RHBCMAD1, RH BREAST RAD, RH BREAST NURSE   [facility] ([facility])    [address] [facility], [address]  [facility] [id number] [phone]           Do not use any powder, lotion or deodorant under your arms or on your breast. If you do, we will ask you to remove it before your exam.  Wear comfortable clothing and a well-fitted bra to the clinic. (A sports bra is best.) Bring a list of your medicines, including vitamins, minerals and over-the-counter drugs. It is best to leave valuables at home.  Tell your care team if:   You have any allergies.   You are taking aspirin, ibuprofen, naproxen or any drug that could increase bleeding.  Bring any previous mammograms from other facilities or have them mailed to the breast center.    Stop taking Coumadin (warfarin) 5 days before treatment. Restart the day after treatment.   If you take Plavix, Ticlid, Pletal or Persantine, ask your doctor if you should stop these medicines. You may need extra tests on the morning of your scan.            Oct 26, 2017  8:00 AM CDT   US BREAST RADIOACTIVE SEED LOCALIZATION  INITIAL LEFT with RHBCUS1   [facility] Breast Ultrasound ([facility])    [address]  [facility] [address]   [phone]           Please bring a list of your medicines (including vitamins, minerals and over-the-counter drugs). Also, tell your doctor about any allergies you may have. Wear comfortable clothes and leave your valuables at home.  You do not need to do anything special to prepare for your exam.  Please call the Imaging Department at your exam site with any questions.            Oct 26, 2017  8:10 AM CDT   MA POST PROCEDURE LEFT with RHBCMAD1   [facility] Imaging ([facility])    [address] [facility], [address]  [facility] [address] [phone]           Do not use any powder, lotion or deodorant under your arms or on your breast. If you do, we will ask you to remove it before your exam.  Wear comfortable, two-piece clothing.  If you have any allergies, tell your care team.  Bring any previous mammograms from other facilities or have them mailed to the breast center.            Oct 26, 2017   Procedure with Jose Sood MD   [facility] PeriOp Services (--)    [address]  [facility] [address]   [phone]            Oct 26, 2017 10:00 AM CDT   [facility] Same Day Surgery with Jose Sood MD   [facility])    [facility])   [phone]            Nov 07, 2017  8:20 AM CST   [facility]    [address]  [facility] [address]   [phone]              Who to contact     If you have questions or need follow up information about today's clinic visit or your schedule please contact [facility] directly at [phone].  Normal or non-critical lab and imaging results will be communicated to you by  "MyChart, letter or phone within 4 business days after the clinic has received the results. If you do not hear from us within 7 days, please contact the clinic through [email or web address] or phone. If you have a critical or abnormal lab result, we will notify you by phone as soon as possible.  Submit refill requests through [employer] or call your pharmacy and they will forward the refill request to us. Please allow 3 business days for your refill to be completed.          Additional Information About Your Visit        [email or web address] Information     [employer] lets you send messages to your doctor, view your test results, renew your prescriptions, schedule appointments and more. To sign up, go to [email or web address] . Click on \"Log in\" on the left side of the screen, which will take you to the Welcome page. Then click on \"Sign up Now\" on the right side of the page.     You will be asked to enter the access code listed below, as well as some personal information. Please follow the directions to create your username and password.     Your access code is: [email or web address]  Expires: 2017  2:50 PM     Your access code will  in 90 days. If you need help or a new code, please call your [place] clinic or [phone].        Care EveryWhere ID     This is your Care EveryWhere ID. This could be used by other organizations to access your [place] medical records  [id number]        Your Vitals Were     Pulse Temperature Height Last Period Pulse Oximetry BMI (Body Mass Index)    88 97.9  F (36.6  C) (Oral) 5' 4\" (1.626 m) 2003 99% 27.89 kg/m2       Blood Pressure from Last 3 Encounters:   17 118/80   17 122/62   17 110/60    Weight from Last 3 Encounters:   17 162 lb 8 oz (73.7 kg)   17 155 lb (70.3 kg)   17 164 lb (74.4 kg)              Today, you had the following     No orders found for display         Today's Medication Changes          These changes are accurate as of: 17  9:16 AM.  If " you have any questions, ask your nurse or doctor.               Start taking these medicines.        Dose/Directions    cefuroxime 500 MG tablet   Commonly known as:  CEFTIN   Used for:  Acute non-recurrent sinusitis, unspecified location   Started by:  Hunter Beltrán MD        Dose:  500 mg   Take 1 tablet (500 mg) by mouth 2 times daily for 7 days   Quantity:  14 tablet   Refills:  1       predniSONE 10 MG tablet   Commonly known as:  DELTASONE   Used for:  Acute non-recurrent sinusitis, unspecified location   Started by:  Hunter Beltrán MD        2 daily for 4 days then 1 daily for 4 days.   Quantity:  12 tablet   Refills:  0            Where to get your medicines      These medications were sent to [facility] [address]  [address] [facility] [address]     Phone:  [phone]     cefuroxime 500 MG tablet    predniSONE 10 MG tablet                Primary Care Provider Office Phone # Fax #    Twin Aldridge [phone]       [address]  [facility] [address]        Equal Access to Services     [facility]: Hadii aad ku hadasho Soomaali, waaxda luqadaha, qaybta kaalmada adeegyada, waxay mike rasheed . So [facility] [phone].    ATENCIÓN: Si habla español, tiene a barton disposición servicios gratuitos de asistencia lingüística. [facility] [phone].    We comply with applicable federal civil rights laws and [place] laws. We do not discriminate on the basis of race, color, national origin, age, disability sex, sexual orientation or gender identity.            Thank you!     Thank you for choosing [facility]  for your care. Our goal is always to provide you with excellent care. Hearing back from our patients is one way we can continue to improve our services. Please take a few minutes to complete the written survey that you may receive in the mail after your visit with us. Thank you!              Your Updated Medication List - Protect others around you: Learn how to safely use, store and throw away your medicines at www.disposemymeds.org.          This list is accurate as of: 9/6/17  9:16 AM.  Always use your most recent med list.                   Brand Name Dispense Instructions for use Diagnosis    aspirin 81 MG tablet     100    ONE DAILY    Unspecified essential hypertension       cefuroxime 500 MG tablet    CEFTIN    14 tablet    Take 1 tablet (500 mg) by mouth 2 times daily for 7 days    Acute non-recurrent sinusitis, unspecified location       desonide 0.05 % cream    DESOWEN    30 g    Apply  topically 2 times daily. May be used on external ears, face and neck.    Dermatitis       levothyroxine 137 MCG tablet    SYNTHROID/LEVOTHROID    90 tablet    Take 1 tablet (137 mcg) by mouth daily Note dosing change.    Other specified hypothyroidism       lisinopril 10 MG tablet    PRINIVIL/ZESTRIL    90 tablet    Take 1 tablet (10 mg) by mouth daily    Hypertension goal BP (blood pressure) < 140/80       pantoprazole 40 MG EC tablet    PROTONIX    90 tablet    Take 1 tablet (40 mg) by mouth daily Take 30-60 minutes before a meal.    Esophageal reflux       pramipexole 0.125 MG tablet    MIRAPEX    120 tablet    Take 2 tablets at bedtime. May raise dose by one tab every 4-7 days if needed, until symptoms better or maximum of 4 tabs qhs.    Restless legs syndrome (RLS)       predniSONE 10 MG tablet    DELTASONE    12 tablet    2 daily for 4 days then 1 daily for 4 days.    Acute non-recurrent sinusitis, unspecified location       RESTASIS 0.05 % ophthalmic emulsion   Generic drug:  cycloSPORINE           SUMAtriptan 100 MG tablet    IMITREX    27 tablet    Take 1 tablet (100 mg) by mouth at onset of headache for migraine May repeat in 2 hours if needed.    Migraine without status migrainosus, not intractable, unspecified migraine type       terbinafine 250 MG tablet    lamISIL    42 tablet    Take 1  tablet (250 mg) by mouth daily    Onychomycosis       triamcinolone 0.1 % cream    KENALOG    80 g    Apply topically 2 times daily    Progressive pigmentary dermatosis of Schamberg       verapamil 240 MG CR tablet    CALAN-SR    90 tablet    Take 1 tablet (240 mg) by mouth daily    Hypertension goal BP (blood pressure) < 140/80

## 2017-09-06 NOTE — NURSING NOTE
"Chief Complaint   Patient presents with     Sinus Problem     Has headache, can't breathe out of left nostril in the middle of the night x 2-5 days.       Initial /80 (BP Location: Right arm, Patient Position: Sitting, Cuff Size: Adult Regular)  Pulse 88  Temp 97.9  F (36.6  C) (Oral)  Ht 5' 4\" (1.626 m)  Wt 162 lb 8 oz (73.7 kg)  LMP 12/01/2003  SpO2 99%  BMI 27.89 kg/m2 Estimated body mass index is 27.89 kg/(m^2) as calculated from the following:    Height as of this encounter: 5' 4\" (1.626 m).    Weight as of this encounter: 162 lb 8 oz (73.7 kg).  Medication Reconciliation: complete   Kim Zamora MA      "

## 2017-09-06 NOTE — PROGRESS NOTES
CHIEF COMPLAINT    CHIEF COMPLAINT    Sinus HA      HISTORY    She has had congestion for 5 days. Pressure L retro nasal and retro orbital regions. Some post nasal drainage. No treatment yet.    Patient Active Problem List   Diagnosis     Hypothyroidism due to acquired atrophy of thyroid     Essential hypertension     Chronic rhinitis     Migraine variant     Family history of colon cancer     CARDIOVASCULAR SCREENING; LDL GOAL LESS THAN 130     Advance care planning     Microscopic colitis     Anxiety     Raynaud's syndrome     Varicose veins of lower extremities with complications     Cryoglobulinemia (H)     Hypertension goal BP (blood pressure) < 140/80     Restless leg syndrome     Current Outpatient Prescriptions   Medication Sig Dispense Refill     cefuroxime (CEFTIN) 500 MG tablet Take 1 tablet (500 mg) by mouth 2 times daily for 7 days 14 tablet 1     predniSONE (DELTASONE) 10 MG tablet 2 daily for 4 days then 1 daily for 4 days. 12 tablet 0     verapamil (CALAN-SR) 240 MG CR tablet Take 1 tablet (240 mg) by mouth daily 90 tablet 0     lisinopril (PRINIVIL/ZESTRIL) 10 MG tablet Take 1 tablet (10 mg) by mouth daily 90 tablet 0     levothyroxine (SYNTHROID/LEVOTHROID) 137 MCG tablet Take 1 tablet (137 mcg) by mouth daily Note dosing change. 90 tablet 0     RESTASIS 0.05 % ophthalmic emulsion        pantoprazole (PROTONIX) 40 MG EC tablet Take 1 tablet (40 mg) by mouth daily Take 30-60 minutes before a meal. (Patient not taking: Reported on 9/6/2017) 90 tablet 0     SUMAtriptan (IMITREX) 100 MG tablet Take 1 tablet (100 mg) by mouth at onset of headache for migraine May repeat in 2 hours if needed. (Patient not taking: Reported on 9/6/2017) 27 tablet 0     pramipexole (MIRAPEX) 0.125 MG tablet Take 2 tablets at bedtime. May raise dose by one tab every 4-7 days if needed, until symptoms better or maximum of 4 tabs qhs. (Patient not taking: Reported on 9/6/2017) 120 tablet 4     terbinafine (LAMISIL) 250 MG  "tablet Take 1 tablet (250 mg) by mouth daily (Patient not taking: Reported on 9/6/2017) 42 tablet 0     triamcinolone (KENALOG) 0.1 % cream Apply topically 2 times daily (Patient not taking: Reported on 9/6/2017) 80 g 2     desonide (DESOWEN) 0.05 % cream Apply  topically 2 times daily. May be used on external ears, face and neck. (Patient not taking: Reported on 9/6/2017) 30 g 1     ASPIRIN 81 MG OR TABS ONE DAILY (Patient not taking: No sig reported) 100 3       REVIEW OF SYSTEMS    No ear pain  No ST  No cough      Past Medical History:   Diagnosis Date     Chronic rhinitis      Unspecified essential hypertension      Unspecified hypothyroidism      Variants of migraine, not elsewhere classified, without mention of intractable migraine without mention of status migrainosus        EXAM  /80 (BP Location: Right arm, Patient Position: Sitting, Cuff Size: Adult Regular)  Pulse 88  Temp 97.9  F (36.6  C) (Oral)  Ht 5' 4\" (1.626 m)  Wt 162 lb 8 oz (73.7 kg)  LMP 12/01/2003  SpO2 99%  BMI 27.89 kg/m2    TM's: nl  Phar: no redness  Neck: mild incr ant cervical nodes  CV: RSR w/o murmur      (J01.90) Acute non-recurrent sinusitis, unspecified location  (primary encounter diagnosis)  Comment:   Plan: cefuroxime (CEFTIN) 500 MG tablet, predniSONE         (DELTASONE) 10 MG tablet            Take prescribed medication as directed.    OK to also use generic Zyrtec.      "

## 2017-10-04 DIAGNOSIS — G25.81 RESTLESS LEGS SYNDROME (RLS): ICD-10-CM

## 2017-10-04 RX ORDER — PRAMIPEXOLE DIHYDROCHLORIDE 0.12 MG/1
0.38 TABLET ORAL AT BEDTIME
Qty: 90 TABLET | Refills: 0 | Status: SHIPPED | OUTPATIENT
Start: 2017-10-04 | End: 2017-11-14

## 2017-10-04 NOTE — TELEPHONE ENCOUNTER
Pt called via VM. Stated she is on vacation in [place] and is out of Mirapex. Needs rx sent to [facility] in [facility]          Called pt-pt stated she did not realize she was out of medication, and is on vacation. Pt is taking 3 q hs. Relayed I will send rx for 1mo supp       Per Seema to 1mo supply       Rx sent to pharm. Pt aware

## 2017-10-24 ENCOUNTER — OFFICE VISIT (OUTPATIENT)
Dept: INTERNAL MEDICINE | Facility: CLINIC | Age: 67
End: 2017-10-24
Payer: COMMERCIAL

## 2017-10-24 VITALS
BODY MASS INDEX: 27.95 KG/M2 | SYSTOLIC BLOOD PRESSURE: 104 MMHG | TEMPERATURE: 98 F | HEIGHT: 64 IN | DIASTOLIC BLOOD PRESSURE: 68 MMHG | HEART RATE: 96 BPM | WEIGHT: 163.7 LBS | OXYGEN SATURATION: 99 %

## 2017-10-24 DIAGNOSIS — I10 ESSENTIAL HYPERTENSION: ICD-10-CM

## 2017-10-24 DIAGNOSIS — N63.0 LUMP OR MASS IN BREAST: ICD-10-CM

## 2017-10-24 DIAGNOSIS — Z01.818 PRE-OP EXAM: Primary | ICD-10-CM

## 2017-10-24 DIAGNOSIS — G43.809 MIGRAINE VARIANT: ICD-10-CM

## 2017-10-24 DIAGNOSIS — E03.4 HYPOTHYROIDISM DUE TO ACQUIRED ATROPHY OF THYROID: ICD-10-CM

## 2017-10-24 PROCEDURE — 93000 ELECTROCARDIOGRAM COMPLETE: CPT | Performed by: INTERNAL MEDICINE

## 2017-10-24 PROCEDURE — 99214 OFFICE O/P EST MOD 30 MIN: CPT | Performed by: INTERNAL MEDICINE

## 2017-10-24 RX ORDER — LORAZEPAM 0.5 MG/1
.5-1 TABLET ORAL
Qty: 10 TABLET | Refills: 0 | Status: SHIPPED | OUTPATIENT
Start: 2017-10-24 | End: 2024-09-12

## 2017-10-24 NOTE — NURSING NOTE
"Chief Complaint   Patient presents with     Pre-Op Exam       Initial /68 (BP Location: Right arm, Patient Position: Chair, Cuff Size: Adult Large)  Pulse 96  Temp 98  F (36.7  C) (Oral)  Ht 5' 4\" (1.626 m)  Wt 163 lb 11.2 oz (74.3 kg)  LMP 12/01/2003  SpO2 99%  Breastfeeding? No  BMI 28.1 kg/m2 Estimated body mass index is 28.1 kg/(m^2) as calculated from the following:    Height as of this encounter: 5' 4\" (1.626 m).    Weight as of this encounter: 163 lb 11.2 oz (74.3 kg).  Medication Reconciliation: complete   Cora Red CMA      "

## 2017-10-24 NOTE — PROGRESS NOTES
[facility]  [address]  [facility] [address]  [phone]  Dept: [phone]    PRE-OP EVALUATION:  Today's date: 10/24/2017    China Patterson (: 1950) presents for pre-operative evaluation assessment as requested by Dr. Jose Sood.  She requires evaluation and anesthesia risk assessment prior to undergoing surgery/procedure for treatment of breast mass .  Proposed procedure: left Breast Seed Localized Excisional Biopsy     Date of Surgery/ Procedure: 10/26/17  Time of Surgery/ Procedure: 10:10AM  Hospital/Surgical Facility: [facility]  Primary Physician: Twin Aldridge  Type of Anesthesia Anticipated: Surgeon requests choice anesthesia    Patient has a Health Care Directive or Living Will:  YES we have one on file here at [facility]    Preop Questions 10/24/2017   1.  Do you have a history of heart attack, stroke, stent, bypass or surgery on an artery in the head, neck, heart or legs? No   2.  Do you ever have any pain or discomfort in your chest? No   3.  Do you have a history of  Heart Failure? No   4.   Are you troubled by shortness of breath when:  walking on a level surface, or up a slight hill, or at night? No   5.  Do you currently have a cold, bronchitis or other respiratory infection? No   6.  Do you have a cough, shortness of breath, or wheezing? No   7.  Do you sometimes get pains in the calves of your legs when you walk? No   8. Do you or anyone in your family have previous history of blood clots? No   9.  Do you or does anyone in your family have a serious bleeding problem such as prolonged bleeding following surgeries or cuts? No   10. Have you ever had problems with anemia or been told to take iron pills? No   11. Have you had any abnormal blood loss such as black, tarry or bloody stools, or abnormal vaginal bleeding? No   12. Have you ever had a blood transfusion? No   13. Have you or any of your relatives ever had problems with  anesthesia? No   14. Do you have sleep apnea, excessive snoring or daytime drowsiness? No   15. Do you have any prosthetic heart valves? No   16. Do you have prosthetic joints? No   17. Is there any chance that you may be pregnant? No       CC:  Preop for multiple medical problems.    HPI:    The patient is scheduled for breast biopsy surgery with Dr. Sood on  Oct 26, 2017   No other acute complaints.    Assessment:  1. V72.83H Preop general physical exam _ I do not see any major contraindications for the patient to go through the scheduled surgery.    The proposed surgical procedure is considered  LOW  surgery risk.    For above listed surgery and anesthesia, Patient is at  MODERATE,  risk for surgery/procedure and perioperative/procedure complications.        ECG:    sinus rhythm, no changes suggestive for ischemia    (N63.0) Lump or mass in L breast  Comment: she is worried about surgery  Plan: biopsy , as above   Lorazepam prn       (I10) Essential hypertension  Comment: Controlled    Plan: Continue same meds, same doses for now     (E03.4) Hypothyroidism due to acquired atrophy of thyroid  Comment: Controlled    Plan: Continue same meds, same doses for now     (G43.809) Migraine variant  Comment: pablito  Plan:        Plan:   In the morning of the surgery day you take with a sip of water just these meds: Verapamil.  The rest of the meds you resume after surgery.      ROS:   General: Negative for fever, chills, major weight changes, fatigue  Skin: Negative for rashes, abnormal spots  Eyes: Negative for blurred or double vision  ENT/mouth: Negative for sinuses discomfort, earache, sore throat  Respiratory: Negative for cough, wheezes, chronic lung disease  Cardiovascular: Negative for rest or exertional chest pain, shortness of breath, palpitations, leg edema,   Gastrointestinal: Negative for vomiting, abdominal pain, heartburn, blood in stool, diarrhea, constipation  Genitourinary: Negative for urinary  frequency, blood in urine, history of kidney stones  Neuro: Negative for headaches, numbness, tingling, weakness in arms or legs, history of seizure, recent syncope  Psychiatry: Negative for depression, anxiety, suicidal thoughts  Endo: Negative for known thyroid disease, diabetes.  Hemato/Lymph: Negative for nodes, easy bleeding, history of DVT, blood transfusion  Musculoskeletal: Negative for joint swelling, back pain      Past Medical History:   Diagnosis Date     Chronic rhinitis      Microscopic colitis      Unspecified essential hypertension      Unspecified hypothyroidism      Variants of migraine, not elsewhere classified, without mention of intractable migraine without mention of status migrainosus      Past Surgical History:   Procedure Laterality Date     C NONSPECIFIC PROCEDURE  2009    Breast biopsy--benign     COLONOSCOPY  2012    Mild changes of collagenous colitis     COLONOSCOPY  2009    Normal     HC COLONOSCOPY THRU STOMA, DIAGNOSTIC  2003    No polyps.      LASIK  ~2008     NO HISTORY OF SURGERY          PSHx: No complications with prior surgeries or anesthesias.    Soc Hx: No daily alcohol, no smoking    Family History   Problem Relation Age of Onset     Cancer - colorectal Father       age 63     HEART DISEASE Paternal Grandmother      Heart Failure     CEREBROVASCULAR DISEASE Paternal Grandmother      HEART DISEASE Paternal Grandfather      MI fatal age 60     HEART DISEASE Mother      Mild heart attack in , otherwise quite healthy, born in 1923     Cardiovascular Mother      Alzheimer Disease Mother 83      age 89     Neurologic Disorder Sister      Migraine headaches     Family History Negative Brother      Born 1947      All: reviewed    Current Outpatient Prescriptions   Medication Sig Dispense Refill     pramipexole (MIRAPEX) 0.125 MG tablet Take 3 tablets (0.375 mg) by mouth At Bedtime 90 tablet 0     RESTASIS 0.05 % ophthalmic emulsion        verapamil  "(CALAN-SR) 240 MG CR tablet Take 1 tablet (240 mg) by mouth daily 90 tablet 0     lisinopril (PRINIVIL/ZESTRIL) 10 MG tablet Take 1 tablet (10 mg) by mouth daily 90 tablet 0     levothyroxine (SYNTHROID/LEVOTHROID) 137 MCG tablet Take 1 tablet (137 mcg) by mouth daily Note dosing change. 90 tablet 0     SUMAtriptan (IMITREX) 100 MG tablet Take 1 tablet (100 mg) by mouth at onset of headache for migraine May repeat in 2 hours if needed. 27 tablet 0     terbinafine (LAMISIL) 250 MG tablet Take 1 tablet (250 mg) by mouth daily 42 tablet 0     triamcinolone (KENALOG) 0.1 % cream Apply topically 2 times daily 80 g 2     desonide (DESOWEN) 0.05 % cream Apply  topically 2 times daily. May be used on external ears, face and neck. 30 g 1     ASPIRIN 81 MG OR TABS ONE DAILY 100 3     pantoprazole (PROTONIX) 40 MG EC tablet Take 1 tablet (40 mg) by mouth daily Take 30-60 minutes before a meal. 90 tablet 0        Physical exam:  Blood pressure 104/68, pulse 96, temperature 98  F (36.7  C), temperature source Oral, height 5' 4\" (1.626 m), weight 163 lb 11.2 oz (74.3 kg), last menstrual period 12/01/2003, SpO2 99 %, not currently breastfeeding.    NAD, appears comfortable  Skin clear, no rashes  HEENT: PERRLA, EOMI, anicteric sclera, pink conjunctiva, external ears appear normal, bilateral tympanic membranes clinically normal, oropharynx normal color.  Neck: supple, no JVD,  no thyroidmegaly  Lymph nodes non palpable in the cervical, supraclavicular   Chest: clear to auscultation with good respiratory effort  Cardiac: S1S2, RRR, no mgr appreciated  Abdomen: soft, not tender, not distended, audible bowel sound, no hepatosplenomegaly, no palpable masses, no abdominal bruits  Extremities: no cyanosis, clubbing or edema.   Neuro: A, Ox3, no focal signs.      Becka Gabriel MD  Internal Medicine       MEDICAL HISTORY:                                                    Patient Active Problem List    Diagnosis Date Noted     Restless " leg syndrome 04/14/2017     Priority: Medium     Hypertension goal BP (blood pressure) < 140/80 08/12/2015     Priority: Medium     Raynaud's syndrome 03/25/2015     Priority: Medium     Varicose veins of lower extremities with complications 03/25/2015     Priority: Medium     Problem list name updated by automated process. Provider to review       Cryoglobulinemia (H) 03/25/2015     Priority: Medium     Diagnosis updated by automated process. Provider to review and confirm.       Anxiety 06/13/2013     Priority: Medium     Microscopic colitis 11/13/2012     Priority: Medium     Advance care planning 03/03/2011     Priority: Medium     Advance Care Planning 9/7/2016: Receipt of ACP document:  Received: Health Care Directive which was witnessed or notarized on 8-.  Document not previously scanned.  Validation form completed and sent with document to be scanned.  Code Status needs to be updated to reflect choices in most recent ACP document.  Confirmed/documented designated decision maker(s).  Added by Mague Hair           CARDIOVASCULAR SCREENING; LDL GOAL LESS THAN 130 10/31/2010     Priority: Medium     Family history of colon cancer 09/26/2008     Priority: Medium     Chronic rhinitis 12/01/2004     Priority: Medium     Migraine variant 12/01/2004     Priority: Medium     Problem list name updated by automated process. Provider to review       Essential hypertension 03/05/2003     Priority: Medium     Problem list name updated by automated process. Provider to review       Hypothyroidism due to acquired atrophy of thyroid 12/09/2002     Priority: Medium     Problem list name updated by automated process. Provider to review        Past Medical History:   Diagnosis Date     Chronic rhinitis      Microscopic colitis      Unspecified essential hypertension      Unspecified hypothyroidism      Variants of migraine, not elsewhere classified, without mention of intractable migraine without mention of status  migrainosus      Past Surgical History:   Procedure Laterality Date     C NONSPECIFIC PROCEDURE  2/2009    Breast biopsy--benign     COLONOSCOPY  4/23/2012    Mild changes of collagenous colitis     COLONOSCOPY  12/17/2009    Normal     HC COLONOSCOPY THRU STOMA, DIAGNOSTIC  12/16/2003    No polyps.      LASIK  ~2/2008     NO HISTORY OF SURGERY       Current Outpatient Prescriptions   Medication Sig Dispense Refill     pramipexole (MIRAPEX) 0.125 MG tablet Take 3 tablets (0.375 mg) by mouth At Bedtime 90 tablet 0     RESTASIS 0.05 % ophthalmic emulsion        verapamil (CALAN-SR) 240 MG CR tablet Take 1 tablet (240 mg) by mouth daily 90 tablet 0     lisinopril (PRINIVIL/ZESTRIL) 10 MG tablet Take 1 tablet (10 mg) by mouth daily 90 tablet 0     levothyroxine (SYNTHROID/LEVOTHROID) 137 MCG tablet Take 1 tablet (137 mcg) by mouth daily Note dosing change. 90 tablet 0     pantoprazole (PROTONIX) 40 MG EC tablet Take 1 tablet (40 mg) by mouth daily Take 30-60 minutes before a meal. (Patient not taking: Reported on 9/6/2017) 90 tablet 0     SUMAtriptan (IMITREX) 100 MG tablet Take 1 tablet (100 mg) by mouth at onset of headache for migraine May repeat in 2 hours if needed. (Patient not taking: Reported on 9/6/2017) 27 tablet 0     terbinafine (LAMISIL) 250 MG tablet Take 1 tablet (250 mg) by mouth daily (Patient not taking: Reported on 9/6/2017) 42 tablet 0     triamcinolone (KENALOG) 0.1 % cream Apply topically 2 times daily (Patient not taking: Reported on 9/6/2017) 80 g 2     desonide (DESOWEN) 0.05 % cream Apply  topically 2 times daily. May be used on external ears, face and neck. (Patient not taking: Reported on 9/6/2017) 30 g 1     ASPIRIN 81 MG OR TABS ONE DAILY (Patient not taking: No sig reported) 100 3     OTC products: None, except as noted above    Allergies   Allergen Reactions     No Known Drug Allergies       Latex Allergy: NO    Social History   Substance Use Topics     Smoking status: Never Smoker      Smokeless tobacco: Never Used     Alcohol use Yes      Comment: Rare social use     History   Drug Use No       Recent Labs   Lab Test  04/07/17   0929  08/25/16   1048  05/27/16   1205   HGB   --   12.5  12.3   PLT   --   231  229   NA  140  136   --    POTASSIUM  4.2  4.0   --    CR  0.68  0.55   --

## 2017-10-24 NOTE — PATIENT INSTRUCTIONS
Plan:  1. In the morning of the surgery day you take with a sip of water just these meds: Verapamil.  The rest of the meds you resume after surgery.

## 2017-10-24 NOTE — MR AVS SNAPSHOT
After Visit Summary   10/24/2017    China Patterson    MRN: [id number]           Patient Information     Date Of Birth          1950        Visit Information        Provider Department      10/24/2017 9:20 AM Becka Maldonado MD [facility]        Today's Diagnoses     Pre-op exam    -  1    Lump or mass in breast        Essential hypertension        Hypothyroidism due to acquired atrophy of thyroid        Migraine variant          Care Instructions    Plan:  1. In the morning of the surgery day you take with a sip of water just these meds: Verapamil.  The rest of the meds you resume after surgery.          Follow-ups after your visit        Your next 10 appointments already scheduled     Oct 26, 2017  8:00 AM CDT   MA RADIOACTIVE SEED LOCALIZATION LEFT with RHBCMAD1, RH BREAST RAD,  BREAST NURSE   [facility] ([facility])    [address] [facility], [address]  [facility] [address] [phone]           Do not use any powder, lotion or deodorant under your arms or on your breast. If you do, we will ask you to remove it before your exam.  Wear comfortable clothing and a well-fitted bra to the clinic. (A sports bra is best.) Bring a list of your medicines, including vitamins, minerals and over-the-counter drugs. It is best to leave valuables at home.  Tell your care team if:   You have any allergies.   You are taking aspirin, ibuprofen, naproxen or any drug that could increase bleeding.  Bring any previous mammograms from other facilities or have them mailed to the breast center.    Stop taking Coumadin (warfarin) 5 days before treatment. Restart the day after treatment.   If you take Plavix, Ticlid, Pletal or Persantine, ask your doctor if you should stop these medicines. You may need extra tests on the morning of your scan.            Oct 26, 2017  8:00 AM CDT   US BREAST RADIOACTIVE SEED LOCALIZATION INITIAL LEFT with RHBCUS1    [facility] Breast Ultrasound ([facility])    [address], [address]  [facility] [address]   [phone]           Please bring a list of your medicines (including vitamins, minerals and over-the-counter drugs). Also, tell your doctor about any allergies you may have. Wear comfortable clothes and leave your valuables at home.  You do not need to do anything special to prepare for your exam.  Please call the Imaging Department at your exam site with any questions.            Oct 26, 2017  8:10 AM CDT   MA POST PROCEDURE LEFT with RHBCMAD1   [facility] Imaging ([facility])    [address] [facility], [address]  [facility] [id number] [phone]           Do not use any powder, lotion or deodorant under your arms or on your breast. If you do, we will ask you to remove it before your exam.  Wear comfortable, two-piece clothing.  If you have any allergies, tell your care team.  Bring any previous mammograms from other facilities or have them mailed to the breast center.            Oct 26, 2017 10:00 AM CDT   [facility] Same Day Surgery with Jose Sood MD, Lori Saab PA-C   [facility] ([facility])    [facility] ([facility])   [phone]            Oct 26, 2017   Procedure with Jose Sood MD   [facility] PeriOp Services (--)    [address]  [facility] [address]   [phone] Nov 07, 2017  8:20 AM CST   SHORT with Twin Aldridge MD   [facility]    [address]  [facility] [address]   [phone]              Who to contact     If you have questions or need follow up information about today's clinic visit or your schedule please contact [facility] directly at [phone].  Normal or non-critical lab and imaging results will be communicated to you by  "MyChart, letter or phone within 4 business days after the clinic has received the results. If you do not hear from us within 7 days, please contact the clinic through [employer] or phone. If you have a critical or abnormal lab result, we will notify you by phone as soon as possible.  Submit refill requests through [employer] or call your pharmacy and they will forward the refill request to us. Please allow 3 business days for your refill to be completed.          Additional Information About Your Visit        [employer] Information     [employer] lets you send messages to your doctor, view your test results, renew your prescriptions, schedule appointments and more. To sign up, go to [email or web address] . Click on \"Log in\" on the left side of the screen, which will take you to the Welcome page. Then click on \"Sign up Now\" on the right side of the page.     You will be asked to enter the access code listed below, as well as some personal information. Please follow the directions to create your username and password.     Your access code is: [email or web address]  Expires: 2017  2:50 PM     Your access code will  in 90 days. If you need help or a new code, please call your [place] clinic or [phone].        Care EveryWhere ID     This is your Care EveryWhere ID. This could be used by other organizations to access your [place] medical records  [id number]        Your Vitals Were     Pulse Temperature Height Last Period Pulse Oximetry Breastfeeding?    96 98  F (36.7  C) (Oral) 5' 4\" (1.626 m) 2003 99% No    BMI (Body Mass Index)                   28.1 kg/m2            Blood Pressure from Last 3 Encounters:   10/24/17 104/68   17 118/80   17 122/62    Weight from Last 3 Encounters:   10/24/17 163 lb 11.2 oz (74.3 kg)   17 162 lb 8 oz (73.7 kg)   17 155 lb (70.3 kg)              We Performed the Following     EKG 12-lead complete w/read - Clinics        Primary Care Provider Office Phone # " Fax #    Twin Aldridge [phone]       [address] [facility] [address]  [facility] [address]        Equal Access to Services     SASHAKELLEY SEFERINO : Tata stephanie ball juan Sotawny, wamarcda luqadaha, qaybta kaalmada praveena, graciela kohlijustin wilfredo. So [facility] [phone].    ATENCIÓN: Si habla español, tiene a barton disposición servicios gratuitos de asistencia lingüística. Llame al [phone].    We comply with applicable federal civil rights laws and [place] laws. We do not discriminate on the basis of race, color, national origin, age, disability, sex, sexual orientation, or gender identity.            Thank you!     Thank you for choosing [facility]  for your care. Our goal is always to provide you with excellent care. Hearing back from our patients is one way we can continue to improve our services. Please take a few minutes to complete the written survey that you may receive in the mail after your visit with us. Thank you!             Your Updated Medication List - Protect others around you: Learn how to safely use, store and throw away your medicines at www.disposemymeds.org.          This list is accurate as of: 10/24/17 10:00 AM.  Always use your most recent med list.                   Brand Name Dispense Instructions for use Diagnosis    aspirin 81 MG tablet     100    ONE DAILY    Unspecified essential hypertension       desonide 0.05 % cream    DESOWEN    30 g    Apply  topically 2 times daily. May be used on external ears, face and neck.    Dermatitis       levothyroxine 137 MCG tablet    SYNTHROID/LEVOTHROID    90 tablet    Take 1 tablet (137 mcg) by mouth daily Note dosing change.    Other specified hypothyroidism       lisinopril 10 MG tablet    PRINIVIL/ZESTRIL    90 tablet    Take 1 tablet (10 mg) by mouth daily    Hypertension goal BP (blood pressure) < 140/80       pantoprazole 40 MG EC tablet    PROTONIX    90 tablet    Take 1 tablet (40 mg) by mouth  daily Take 30-60 minutes before a meal.    Esophageal reflux       pramipexole 0.125 MG tablet    MIRAPEX    90 tablet    Take 3 tablets (0.375 mg) by mouth At Bedtime    Restless legs syndrome (RLS)       RESTASIS 0.05 % ophthalmic emulsion   Generic drug:  cycloSPORINE           SUMAtriptan 100 MG tablet    IMITREX    27 tablet    Take 1 tablet (100 mg) by mouth at onset of headache for migraine May repeat in 2 hours if needed.    Migraine without status migrainosus, not intractable, unspecified migraine type       terbinafine 250 MG tablet    lamISIL    42 tablet    Take 1 tablet (250 mg) by mouth daily    Onychomycosis       triamcinolone 0.1 % cream    KENALOG    80 g    Apply topically 2 times daily    Progressive pigmentary dermatosis of [place]       verapamil 240 MG CR tablet    CALAN-SR    90 tablet    Take 1 tablet (240 mg) by mouth daily    Hypertension goal BP (blood pressure) < 140/80

## 2017-10-25 NOTE — H&P (VIEW-ONLY)
[facility]  [address]  [facility] [address]  [phone]  Dept: [phone]    PRE-OP EVALUATION:  Today's date: 10/24/2017    China Patterson (: 1950) presents for pre-operative evaluation assessment as requested by Dr. Jose Sood.  She requires evaluation and anesthesia risk assessment prior to undergoing surgery/procedure for treatment of breast mass .  Proposed procedure: left Breast Seed Localized Excisional Biopsy     Date of Surgery/ Procedure: 10/26/17  Time of Surgery/ Procedure: 10:10AM  Hospital/Surgical Facility: [facility]  Primary Physician: Twin Aldridge  Type of Anesthesia Anticipated: Surgeon requests choice anesthesia    Patient has a Health Care Directive or Living Will:  YES we have one on file here at [facility]    Preop Questions 10/24/2017   1.  Do you have a history of heart attack, stroke, stent, bypass or surgery on an artery in the head, neck, heart or legs? No   2.  Do you ever have any pain or discomfort in your chest? No   3.  Do you have a history of  Heart Failure? No   4.   Are you troubled by shortness of breath when:  walking on a level surface, or up a slight hill, or at night? No   5.  Do you currently have a cold, bronchitis or other respiratory infection? No   6.  Do you have a cough, shortness of breath, or wheezing? No   7.  Do you sometimes get pains in the calves of your legs when you walk? No   8. Do you or anyone in your family have previous history of blood clots? No   9.  Do you or does anyone in your family have a serious bleeding problem such as prolonged bleeding following surgeries or cuts? No   10. Have you ever had problems with anemia or been told to take iron pills? No   11. Have you had any abnormal blood loss such as black, tarry or bloody stools, or abnormal vaginal bleeding? No   12. Have you ever had a blood transfusion? No   13. Have you or any of your relatives ever had problems with  anesthesia? No   14. Do you have sleep apnea, excessive snoring or daytime drowsiness? No   15. Do you have any prosthetic heart valves? No   16. Do you have prosthetic joints? No   17. Is there any chance that you may be pregnant? No       CC:  Preop for multiple medical problems.    HPI:    The patient is scheduled for breast biopsy surgery with Dr. Sood on  Oct 26, 2017   No other acute complaints.    Assessment:  1. V72.83H Preop general physical exam _ I do not see any major contraindications for the patient to go through the scheduled surgery.    The proposed surgical procedure is considered  LOW  surgery risk.    For above listed surgery and anesthesia, Patient is at  MODERATE,  risk for surgery/procedure and perioperative/procedure complications.        ECG:    sinus rhythm, no changes suggestive for ischemia    (N63.0) Lump or mass in L breast  Comment: she is worried about surgery  Plan: biopsy , as above   Lorazepam prn       (I10) Essential hypertension  Comment: Controlled    Plan: Continue same meds, same doses for now     (E03.4) Hypothyroidism due to acquired atrophy of thyroid  Comment: Controlled    Plan: Continue same meds, same doses for now     (G43.809) Migraine variant  Comment: pablito  Plan:        Plan:   In the morning of the surgery day you take with a sip of water just these meds: Verapamil.  The rest of the meds you resume after surgery.      ROS:   General: Negative for fever, chills, major weight changes, fatigue  Skin: Negative for rashes, abnormal spots  Eyes: Negative for blurred or double vision  ENT/mouth: Negative for sinuses discomfort, earache, sore throat  Respiratory: Negative for cough, wheezes, chronic lung disease  Cardiovascular: Negative for rest or exertional chest pain, shortness of breath, palpitations, leg edema,   Gastrointestinal: Negative for vomiting, abdominal pain, heartburn, blood in stool, diarrhea, constipation  Genitourinary: Negative for urinary  frequency, blood in urine, history of kidney stones  Neuro: Negative for headaches, numbness, tingling, weakness in arms or legs, history of seizure, recent syncope  Psychiatry: Negative for depression, anxiety, suicidal thoughts  Endo: Negative for known thyroid disease, diabetes.  Hemato/Lymph: Negative for nodes, easy bleeding, history of DVT, blood transfusion  Musculoskeletal: Negative for joint swelling, back pain      Past Medical History:   Diagnosis Date     Chronic rhinitis      Microscopic colitis      Unspecified essential hypertension      Unspecified hypothyroidism      Variants of migraine, not elsewhere classified, without mention of intractable migraine without mention of status migrainosus      Past Surgical History:   Procedure Laterality Date     C NONSPECIFIC PROCEDURE  2009    Breast biopsy--benign     COLONOSCOPY  2012    Mild changes of collagenous colitis     COLONOSCOPY  2009    Normal     HC COLONOSCOPY THRU STOMA, DIAGNOSTIC  2003    No polyps.      LASIK  ~2008     NO HISTORY OF SURGERY          PSHx: No complications with prior surgeries or anesthesias.    Soc Hx: No daily alcohol, no smoking    Family History   Problem Relation Age of Onset     Cancer - colorectal Father       age 63     HEART DISEASE Paternal Grandmother      Heart Failure     CEREBROVASCULAR DISEASE Paternal Grandmother      HEART DISEASE Paternal Grandfather      MI fatal age 60     HEART DISEASE Mother      Mild heart attack in , otherwise quite healthy, born in 1923     Cardiovascular Mother      Alzheimer Disease Mother 83      age 89     Neurologic Disorder Sister      Migraine headaches     Family History Negative Brother      Born 1947      All: reviewed    Current Outpatient Prescriptions   Medication Sig Dispense Refill     pramipexole (MIRAPEX) 0.125 MG tablet Take 3 tablets (0.375 mg) by mouth At Bedtime 90 tablet 0     RESTASIS 0.05 % ophthalmic emulsion        verapamil  "(CALAN-SR) 240 MG CR tablet Take 1 tablet (240 mg) by mouth daily 90 tablet 0     lisinopril (PRINIVIL/ZESTRIL) 10 MG tablet Take 1 tablet (10 mg) by mouth daily 90 tablet 0     levothyroxine (SYNTHROID/LEVOTHROID) 137 MCG tablet Take 1 tablet (137 mcg) by mouth daily Note dosing change. 90 tablet 0     SUMAtriptan (IMITREX) 100 MG tablet Take 1 tablet (100 mg) by mouth at onset of headache for migraine May repeat in 2 hours if needed. 27 tablet 0     terbinafine (LAMISIL) 250 MG tablet Take 1 tablet (250 mg) by mouth daily 42 tablet 0     triamcinolone (KENALOG) 0.1 % cream Apply topically 2 times daily 80 g 2     desonide (DESOWEN) 0.05 % cream Apply  topically 2 times daily. May be used on external ears, face and neck. 30 g 1     ASPIRIN 81 MG OR TABS ONE DAILY 100 3     pantoprazole (PROTONIX) 40 MG EC tablet Take 1 tablet (40 mg) by mouth daily Take 30-60 minutes before a meal. 90 tablet 0        Physical exam:  Blood pressure 104/68, pulse 96, temperature 98  F (36.7  C), temperature source Oral, height 5' 4\" (1.626 m), weight 163 lb 11.2 oz (74.3 kg), last menstrual period 12/01/2003, SpO2 99 %, not currently breastfeeding.    NAD, appears comfortable  Skin clear, no rashes  HEENT: PERRLA, EOMI, anicteric sclera, pink conjunctiva, external ears appear normal, bilateral tympanic membranes clinically normal, oropharynx normal color.  Neck: supple, no JVD,  no thyroidmegaly  Lymph nodes non palpable in the cervical, supraclavicular   Chest: clear to auscultation with good respiratory effort  Cardiac: S1S2, RRR, no mgr appreciated  Abdomen: soft, not tender, not distended, audible bowel sound, no hepatosplenomegaly, no palpable masses, no abdominal bruits  Extremities: no cyanosis, clubbing or edema.   Neuro: A, Ox3, no focal signs.      Becka Gabriel MD  Internal Medicine       MEDICAL HISTORY:                                                    Patient Active Problem List    Diagnosis Date Noted     Restless " leg syndrome 04/14/2017     Priority: Medium     Hypertension goal BP (blood pressure) < 140/80 08/12/2015     Priority: Medium     Raynaud's syndrome 03/25/2015     Priority: Medium     Varicose veins of lower extremities with complications 03/25/2015     Priority: Medium     Problem list name updated by automated process. Provider to review       Cryoglobulinemia (H) 03/25/2015     Priority: Medium     Diagnosis updated by automated process. Provider to review and confirm.       Anxiety 06/13/2013     Priority: Medium     Microscopic colitis 11/13/2012     Priority: Medium     Advance care planning 03/03/2011     Priority: Medium     Advance Care Planning 9/7/2016: Receipt of ACP document:  Received: Health Care Directive which was witnessed or notarized on 8-.  Document not previously scanned.  Validation form completed and sent with document to be scanned.  Code Status needs to be updated to reflect choices in most recent ACP document.  Confirmed/documented designated decision maker(s).  Added by Mague Hair           CARDIOVASCULAR SCREENING; LDL GOAL LESS THAN 130 10/31/2010     Priority: Medium     Family history of colon cancer 09/26/2008     Priority: Medium     Chronic rhinitis 12/01/2004     Priority: Medium     Migraine variant 12/01/2004     Priority: Medium     Problem list name updated by automated process. Provider to review       Essential hypertension 03/05/2003     Priority: Medium     Problem list name updated by automated process. Provider to review       Hypothyroidism due to acquired atrophy of thyroid 12/09/2002     Priority: Medium     Problem list name updated by automated process. Provider to review        Past Medical History:   Diagnosis Date     Chronic rhinitis      Microscopic colitis      Unspecified essential hypertension      Unspecified hypothyroidism      Variants of migraine, not elsewhere classified, without mention of intractable migraine without mention of status  migrainosus      Past Surgical History:   Procedure Laterality Date     C NONSPECIFIC PROCEDURE  2/2009    Breast biopsy--benign     COLONOSCOPY  4/23/2012    Mild changes of collagenous colitis     COLONOSCOPY  12/17/2009    Normal     HC COLONOSCOPY THRU STOMA, DIAGNOSTIC  12/16/2003    No polyps.      LASIK  ~2/2008     NO HISTORY OF SURGERY       Current Outpatient Prescriptions   Medication Sig Dispense Refill     pramipexole (MIRAPEX) 0.125 MG tablet Take 3 tablets (0.375 mg) by mouth At Bedtime 90 tablet 0     RESTASIS 0.05 % ophthalmic emulsion        verapamil (CALAN-SR) 240 MG CR tablet Take 1 tablet (240 mg) by mouth daily 90 tablet 0     lisinopril (PRINIVIL/ZESTRIL) 10 MG tablet Take 1 tablet (10 mg) by mouth daily 90 tablet 0     levothyroxine (SYNTHROID/LEVOTHROID) 137 MCG tablet Take 1 tablet (137 mcg) by mouth daily Note dosing change. 90 tablet 0     pantoprazole (PROTONIX) 40 MG EC tablet Take 1 tablet (40 mg) by mouth daily Take 30-60 minutes before a meal. (Patient not taking: Reported on 9/6/2017) 90 tablet 0     SUMAtriptan (IMITREX) 100 MG tablet Take 1 tablet (100 mg) by mouth at onset of headache for migraine May repeat in 2 hours if needed. (Patient not taking: Reported on 9/6/2017) 27 tablet 0     terbinafine (LAMISIL) 250 MG tablet Take 1 tablet (250 mg) by mouth daily (Patient not taking: Reported on 9/6/2017) 42 tablet 0     triamcinolone (KENALOG) 0.1 % cream Apply topically 2 times daily (Patient not taking: Reported on 9/6/2017) 80 g 2     desonide (DESOWEN) 0.05 % cream Apply  topically 2 times daily. May be used on external ears, face and neck. (Patient not taking: Reported on 9/6/2017) 30 g 1     ASPIRIN 81 MG OR TABS ONE DAILY (Patient not taking: No sig reported) 100 3     OTC products: None, except as noted above    Allergies   Allergen Reactions     No Known Drug Allergies       Latex Allergy: NO    Social History   Substance Use Topics     Smoking status: Never Smoker      Smokeless tobacco: Never Used     Alcohol use Yes      Comment: Rare social use     History   Drug Use No       Recent Labs   Lab Test  04/07/17   0929  08/25/16   1048  05/27/16   1205   HGB   --   12.5  12.3   PLT   --   231  229   NA  140  136   --    POTASSIUM  4.2  4.0   --    CR  0.68  0.55   --

## 2017-10-26 ENCOUNTER — APPOINTMENT (OUTPATIENT)
Dept: SURGERY | Facility: PHYSICIAN GROUP | Age: 67
End: 2017-10-26
Payer: COMMERCIAL

## 2017-10-26 ENCOUNTER — ANESTHESIA (OUTPATIENT)
Dept: SURGERY | Facility: CLINIC | Age: 67
End: 2017-10-26
Payer: MEDICARE

## 2017-10-26 ENCOUNTER — HOSPITAL ENCOUNTER (OUTPATIENT)
Dept: ULTRASOUND IMAGING | Facility: CLINIC | Age: 67
End: 2017-10-26
Attending: SURGERY | Admitting: SURGERY
Payer: MEDICARE

## 2017-10-26 ENCOUNTER — HOSPITAL ENCOUNTER (OUTPATIENT)
Facility: CLINIC | Age: 67
Discharge: HOME | End: 2017-10-26
Attending: SURGERY | Admitting: SURGERY
Payer: MEDICARE

## 2017-10-26 ENCOUNTER — ANESTHESIA EVENT (OUTPATIENT)
Dept: SURGERY | Facility: CLINIC | Age: 67
End: 2017-10-26
Payer: MEDICARE

## 2017-10-26 ENCOUNTER — HOSPITAL ENCOUNTER (OUTPATIENT)
Dept: MAMMOGRAPHY | Facility: CLINIC | Age: 67
End: 2017-10-26
Attending: SURGERY | Admitting: SURGERY
Payer: MEDICARE

## 2017-10-26 ENCOUNTER — DOCUMENTATION ONLY (OUTPATIENT)
Dept: MAMMOGRAPHY | Facility: CLINIC | Age: 67
End: 2017-10-26

## 2017-10-26 ENCOUNTER — APPOINTMENT (OUTPATIENT)
Dept: MAMMOGRAPHY | Facility: CLINIC | Age: 67
End: 2017-10-26
Attending: SURGERY
Payer: MEDICARE

## 2017-10-26 VITALS
BODY MASS INDEX: 28.09 KG/M2 | HEIGHT: 64 IN | TEMPERATURE: 98 F | OXYGEN SATURATION: 99 % | SYSTOLIC BLOOD PRESSURE: 122 MMHG | DIASTOLIC BLOOD PRESSURE: 71 MMHG | RESPIRATION RATE: 14 BRPM | WEIGHT: 164.5 LBS

## 2017-10-26 DIAGNOSIS — N63.20 LEFT BREAST MASS: ICD-10-CM

## 2017-10-26 DIAGNOSIS — Z98.890 S/P LEFT BREAST BIOPSY: Primary | ICD-10-CM

## 2017-10-26 LAB
CREAT SERPL-MCNC: 0.55 MG/DL (ref 0.52–1.04)
GFR SERPL CREATININE-BSD FRML MDRD: >90 ML/MIN/1.7M2
POTASSIUM SERPL-SCNC: 3.7 MMOL/L (ref 3.4–5.3)

## 2017-10-26 PROCEDURE — 88307 TISSUE EXAM BY PATHOLOGIST: CPT | Performed by: SURGERY

## 2017-10-26 PROCEDURE — 25000125 ZZHC RX 250: Performed by: NURSE ANESTHETIST, CERTIFIED REGISTERED

## 2017-10-26 PROCEDURE — 40000986 MA POST PROCEDURE LEFT

## 2017-10-26 PROCEDURE — 37000008 ZZH ANESTHESIA TECHNICAL FEE, 1ST 30 MIN: Performed by: SURGERY

## 2017-10-26 PROCEDURE — 25000125 ZZHC RX 250: Performed by: SURGERY

## 2017-10-26 PROCEDURE — 40000268 MA BREAST SPECIMEN LEFT OR

## 2017-10-26 PROCEDURE — 19285 PERQ DEV BREAST 1ST US IMAG: CPT | Mod: LT

## 2017-10-26 PROCEDURE — 36000052 ZZH SURGERY LEVEL 2 EA 15 ADDTL MIN: Performed by: SURGERY

## 2017-10-26 PROCEDURE — 25000566 ZZH SEVOFLURANE, EA 15 MIN: Performed by: SURGERY

## 2017-10-26 PROCEDURE — 82565 ASSAY OF CREATININE: CPT | Performed by: ANESTHESIOLOGY

## 2017-10-26 PROCEDURE — 36000054 ZZH SURGERY LEVEL 2 W FLUORO 1ST 30 MIN: Performed by: SURGERY

## 2017-10-26 PROCEDURE — 25000128 H RX IP 250 OP 636: Performed by: NURSE ANESTHETIST, CERTIFIED REGISTERED

## 2017-10-26 PROCEDURE — 71000012 ZZH RECOVERY PHASE 1 LEVEL 1 FIRST HR: Performed by: SURGERY

## 2017-10-26 PROCEDURE — 88307 TISSUE EXAM BY PATHOLOGIST: CPT | Mod: 26 | Performed by: SURGERY

## 2017-10-26 PROCEDURE — 25000128 H RX IP 250 OP 636: Performed by: SURGERY

## 2017-10-26 PROCEDURE — 19125 EXCISION BREAST LESION: CPT | Mod: LT | Performed by: SURGERY

## 2017-10-26 PROCEDURE — 37000009 ZZH ANESTHESIA TECHNICAL FEE, EACH ADDTL 15 MIN: Performed by: SURGERY

## 2017-10-26 PROCEDURE — 27210794 ZZH OR GENERAL SUPPLY STERILE: Performed by: SURGERY

## 2017-10-26 PROCEDURE — 25000128 H RX IP 250 OP 636: Performed by: ANESTHESIOLOGY

## 2017-10-26 PROCEDURE — 71000027 ZZH RECOVERY PHASE 2 EACH 15 MINS: Performed by: SURGERY

## 2017-10-26 PROCEDURE — 40000306 ZZH STATISTIC PRE PROC ASSESS II: Performed by: SURGERY

## 2017-10-26 PROCEDURE — 36415 COLL VENOUS BLD VENIPUNCTURE: CPT | Performed by: ANESTHESIOLOGY

## 2017-10-26 PROCEDURE — 84132 ASSAY OF SERUM POTASSIUM: CPT | Performed by: ANESTHESIOLOGY

## 2017-10-26 RX ORDER — OXYCODONE HYDROCHLORIDE 5 MG/1
5-10 TABLET ORAL
Qty: 30 TABLET | Refills: 0 | Status: SHIPPED | OUTPATIENT
Start: 2017-10-26 | End: 2024-09-12

## 2017-10-26 RX ORDER — ONDANSETRON 2 MG/ML
INJECTION INTRAMUSCULAR; INTRAVENOUS PRN
Status: DISCONTINUED | OUTPATIENT
Start: 2017-10-26 | End: 2017-10-26

## 2017-10-26 RX ORDER — DEXAMETHASONE SODIUM PHOSPHATE 4 MG/ML
INJECTION, SOLUTION INTRA-ARTICULAR; INTRALESIONAL; INTRAMUSCULAR; INTRAVENOUS; SOFT TISSUE PRN
Status: DISCONTINUED | OUTPATIENT
Start: 2017-10-26 | End: 2017-10-26

## 2017-10-26 RX ORDER — LIDOCAINE 40 MG/G
CREAM TOPICAL
Status: DISCONTINUED | OUTPATIENT
Start: 2017-10-26 | End: 2017-10-26 | Stop reason: HOSPADM

## 2017-10-26 RX ORDER — OXYCODONE HYDROCHLORIDE 5 MG/1
5-10 TABLET ORAL
Status: DISCONTINUED | OUTPATIENT
Start: 2017-10-26 | End: 2017-10-26 | Stop reason: HOSPADM

## 2017-10-26 RX ORDER — LIDOCAINE HYDROCHLORIDE 10 MG/ML
INJECTION, SOLUTION INFILTRATION; PERINEURAL PRN
Status: DISCONTINUED | OUTPATIENT
Start: 2017-10-26 | End: 2017-10-26

## 2017-10-26 RX ORDER — FENTANYL CITRATE 50 UG/ML
INJECTION, SOLUTION INTRAMUSCULAR; INTRAVENOUS PRN
Status: DISCONTINUED | OUTPATIENT
Start: 2017-10-26 | End: 2017-10-26

## 2017-10-26 RX ORDER — CEFAZOLIN SODIUM 2 G/100ML
2 INJECTION, SOLUTION INTRAVENOUS
Status: COMPLETED | OUTPATIENT
Start: 2017-10-26 | End: 2017-10-26

## 2017-10-26 RX ORDER — SODIUM CHLORIDE, SODIUM LACTATE, POTASSIUM CHLORIDE, CALCIUM CHLORIDE 600; 310; 30; 20 MG/100ML; MG/100ML; MG/100ML; MG/100ML
INJECTION, SOLUTION INTRAVENOUS CONTINUOUS
Status: DISCONTINUED | OUTPATIENT
Start: 2017-10-26 | End: 2017-10-26 | Stop reason: HOSPADM

## 2017-10-26 RX ORDER — PROPOFOL 10 MG/ML
INJECTION, EMULSION INTRAVENOUS PRN
Status: DISCONTINUED | OUTPATIENT
Start: 2017-10-26 | End: 2017-10-26

## 2017-10-26 RX ORDER — BUPIVACAINE HYDROCHLORIDE AND EPINEPHRINE 2.5; 5 MG/ML; UG/ML
INJECTION, SOLUTION EPIDURAL; INFILTRATION; INTRACAUDAL; PERINEURAL PRN
Status: DISCONTINUED | OUTPATIENT
Start: 2017-10-26 | End: 2017-10-26 | Stop reason: HOSPADM

## 2017-10-26 RX ORDER — CEFAZOLIN SODIUM 1 G/3ML
1 INJECTION, POWDER, FOR SOLUTION INTRAMUSCULAR; INTRAVENOUS SEE ADMIN INSTRUCTIONS
Status: DISCONTINUED | OUTPATIENT
Start: 2017-10-26 | End: 2017-10-26 | Stop reason: HOSPADM

## 2017-10-26 RX ADMIN — DEXAMETHASONE SODIUM PHOSPHATE 4 MG: 4 INJECTION, SOLUTION INTRA-ARTICULAR; INTRALESIONAL; INTRAMUSCULAR; INTRAVENOUS; SOFT TISSUE at 10:18

## 2017-10-26 RX ADMIN — FENTANYL CITRATE 100 MCG: 50 INJECTION, SOLUTION INTRAMUSCULAR; INTRAVENOUS at 10:16

## 2017-10-26 RX ADMIN — FENTANYL CITRATE 50 MCG: 50 INJECTION, SOLUTION INTRAMUSCULAR; INTRAVENOUS at 10:30

## 2017-10-26 RX ADMIN — CEFAZOLIN SODIUM 2 G: 2 INJECTION, SOLUTION INTRAVENOUS at 10:21

## 2017-10-26 RX ADMIN — LIDOCAINE HYDROCHLORIDE 50 MG: 10 INJECTION, SOLUTION INFILTRATION; PERINEURAL at 10:16

## 2017-10-26 RX ADMIN — MIDAZOLAM HYDROCHLORIDE 2 MG: 1 INJECTION, SOLUTION INTRAMUSCULAR; INTRAVENOUS at 10:10

## 2017-10-26 RX ADMIN — SODIUM CHLORIDE, POTASSIUM CHLORIDE, SODIUM LACTATE AND CALCIUM CHLORIDE: 600; 310; 30; 20 INJECTION, SOLUTION INTRAVENOUS at 10:00

## 2017-10-26 RX ADMIN — ONDANSETRON 4 MG: 2 INJECTION INTRAMUSCULAR; INTRAVENOUS at 11:07

## 2017-10-26 RX ADMIN — PROPOFOL 130 MG: 10 INJECTION, EMULSION INTRAVENOUS at 10:16

## 2017-10-26 NOTE — ANESTHESIA POSTPROCEDURE EVALUATION
Patient: China Patterson    Procedure(s):  Left Breast Seed Localized Excisional Biopsy    - Wound Class: I-Clean    Diagnosis:mass  Diagnosis Additional Information: Left breast mass    Anesthesia Type:  General    Note:  Anesthesia Post Evaluation    Patient location during evaluation: PACU  Patient participation: Able to fully participate in evaluation  Level of consciousness: awake and alert  Pain management: adequate  Airway patency: patent  Cardiovascular status: acceptable  Respiratory status: acceptable  Hydration status: acceptable  PONV: none     Anesthetic complications: None          Last vitals:  Vitals:    10/26/17 1119 10/26/17 1130 10/26/17 1145   BP: 115/69 104/67 100/73   Resp: (!) 33 (!) 7 8   Temp: 97.6  F (36.4  C)     SpO2: 99% 100% 96%         Electronically Signed By: Ashwin Anna MD  October 26, 2017  12:04 PM

## 2017-10-26 NOTE — OP NOTE
General Surgery Operative Note      Pre-operative diagnosis: Left breast mass   Post-operative diagnosis: Same   Procedure: Left seed-localized breast biopsy, upper outer quadrant    Surgeon: Jose Sood MD   Assistant(s): Lori Saab PA-C  The Physician Assistant was medically necessary for their expertise in prepping, camera management, suctioning, suturing and retraction.   Anesthesia: General    Estimated blood loss:   5 cc     Specimens:   ID Type Source Tests Collected by Time Destination   A : Left breast localized lumpectomy Tissue Breast, Left SURGICAL PATHOLOGY EXAM Jose Sood MD 10/26/2017 10:37 AM           INDICATIONS:  Left breast mass, 1 o'clock.  Percutaneous biopsy reveals a papilloma.     DESCRIPTION OF PROCEDURE:  The patient was placed on the table in supine position.  Anesthetic was administered.  The left breast was prepped and draped in standard sterile fashion.  We used the seed placed in the Breast Center as well as the post-seed mammograms to localize the area of interest.  After anesthetizing the skin we made an incision centered at the 2 o'clock position.  We carried the incision down into the breast tissue and excised the area of interest, including the seed.  This was marked with a short stitch superiorly and a long stitch laterally and double stitch deep.  A specimen mammogram was performed and sent via PACS to the Breast Center.  The breast radiologist confirmed the presence of the area of interest including the seed and the clip which had been placed at the time of her biopsy.  Hemostasis was maintained throughout with electrocautery.  The skin was closed with running 4-0 Vicryl subcuticular suture and Dermabond.  The patient tolerated the procedure well.  Sponge and instrument counts were correct.    Jose Sood MD

## 2017-10-26 NOTE — PROGRESS NOTES
SBAR Seed Localization    SITUATION:  Patient to breast imaging center for imaging guided seed localizations before breast lumpectomy or excision biopsy without sentinel node injection.    BACKGROUND:  Breast imaging cancer, breast abnormality  Ordered procedure completed: Yes  Special needs identified: No     ASSESSMENT:  SBAR report called to patient care unit because of unexpected event in radiology: No  Allergies and medication list reviewed prior to procedure. No  Skin cleansed with ChloraPrep One-Step.  Anesthesia: approximately 6ml of 1% Lidocaine injection subcutaneous before seed insertion administered by the radiologist.   Gauze dressing over insertion site(s).  Post procedure mammogram completed: Yes    Patient tolerance:patient tolerated left breast ultrasound guided radioactive seed localization well.    RECOMMENDATIONS:  Patient transferred to Same Day Surgery in stable condition via wheelchair with Transport Services.  Copy of note given to patient and instructions to hand this note to surgery staff.    Please call [facility] at [phone] if there are any questions.

## 2017-10-26 NOTE — IP AVS SNAPSHOT
MRN:[id number]                      After Visit Summary   10/26/2017    China Patterson    MRN: [id number]           Thank you!     Thank you for choosing [facility] for your care. Our goal is always to provide you with excellent care. Hearing back from our patients is one way we can continue to improve our services. Please take a few minutes to complete the written survey that you may receive in the mail after you visit. If you would like to speak to someone directly about your visit please contact Patient Relations at [phone]. Thank you!          Patient Information     Date Of Birth          1950        About your hospital stay     You were admitted on:  October 26, 2017 You last received care in the:  [facility] PreOP/PostOP    You were discharged on:  October 26, 2017       Who to Call     For medical emergencies, please call 911.  For non-urgent questions about your medical care, please call your primary care provider or clinic, [phone]  For questions related to your surgery, please call your surgery clinic        Attending Provider     Provider Specialty    Jose Sood MD General Surgery       Primary Care Provider Office Phone # Fax #    Twin Aldridge [phone]      Your next 10 appointments already scheduled     Nov 07, 2017  8:20 AM CST   SHORT with Twin Aldridge MD   [facility]    [facility] [id number] [phone]              Further instructions from your care team             HOME CARE FOLLOWING BREAST BIOPSY  FRANKLIN Coronel E. Gavin, N. Guttormson, D. Maurer, R. O Donnell, L. Thomas    RESULTS:  You may call for your final pathology report after 1p.m. two working days after surgery.    INCISIONAL CARE:    If you have a dressing in place, keep clean and dry for 48 hours; you may replace the gauze if it becomes soiled.    After 48  hours you may remove the dressing and shower.  Do not submerse incision in water for 1 week.    If you have a Dermabond dressing (a type of skin glue), you may shower immediately.    Sutures will absorb and do not need to be removed.    If present, leave the steri-strips (white paper tapes) in place for 14 days after surgery.    If present, leave Dermabond glue in place until it wears/flakes off.    You may expect a small amount of drainage from your incision.    A lump/ridge under the incision is normal and will gradually resolve.    SUPPORT:  Wear a bra for support and comfort for 3-7 days, day and night.    ACTIVITY:  Cautiously resume exercise and strenuous activities such as jogging, tennis, aerobics, etc. Also, be careful of stretching activities with operative side for two weeks.    DIET:  No restrictions.  Increased fluid intake is recommended. While taking pain medications, increase dietary fiber or add a fiber supplementation like Metamucil or Citrucel to help prevent constipation - a possible side effect of pain medications.    DISCOMFORT:  Local anesthetic placed at surgery should provide relief for 4-8 hours.  Begin taking pain pills before discomfort is severe.  Take the pain medication with some food, when possible, to minimize side effects.  Intermittent use of ice packs may help during the first 48 hours.  Expect gradual improvement.    RETURN APPOINTMENT:  Schedule a follow-up visit 2-3 weeks post-op.  Office Phone:  [phone]     CONTACT US IF THE FOLLOWING DEVELOPS:   1. A fever that is above 101     2. If there is a large amount of drainage, bleeding, or swelling.   3. Severe pain that is not relieved by your prescription.   4. Drainage that is thick, cloudy, yellow, green or white.   5. Any other questions not answered by  Frequently Asked Questions  sheet.          FREQUENTLY ASKED QUESTIONS:    Q:  How should my incision look?    A:  Normally your incision will appear slightly swollen with  light redness directly along the incision itself as it heals.  It may feel like a bump or ridge as the healing/scarring happens, and over time (3-4 months) this bump or ridge feeling should slowly go away.  In general, clear or pink watery drainage can be normal at first as your incision heals, but should decrease over time.    Q:  How do I know if my incision is infected?  A:  Look at your incision for signs of infection, like redness around the incision spreading to surrounding skin, or drainage of cloudy or foul-smelling drainage.  If you feel warm, check your temperature to see if you are running a fever.    **If any of these things occur, please notify the nurse at our office.  We may need you to come into the office for an incision check.      Q:  How do I take care of my incision?  A:  If you have a dressing in place - Starting the day after surgery, replace the dressing 1-2 times a day until there is no further drainage from the incision.  At that time, a dressing is no longer needed.  Try to minimize tape on the skin if irritation is occurring at the tape sites.  If you have significant irritation from tape on the skin, please call the office to discuss other method of dressing your incision.    Small pieces of tape called  steri-strips  may be present directly overlying your incision; these may be removed 10 days after surgery unless otherwise specified by your surgeon.  If these tapes start to loosen at the ends, you may trim them back until they fall off or are removed.    A:  If you had  Dermabond  tissue glue used as a dressing (this causes your incision to look shiny with a clear covering over it) - This type of dressing wears off with time and does not require more dressings over the top unless it is draining around the glue as it wears off.  Do not apply ointments or lotions over the incisions until the glue has completely worn off.    Q:  There is a piece of tape or a sticky  lead  still on my  skin.  Can I remove this?  A:  Sometimes the sticky  leads  used for monitoring during surgery or for evaluation in the emergency department are not all removed while you are in the hospital.  These sometimes have a tab or metal dot on them.  You can easily remove these on your own, like taking off a band-aid.  If there is a gel substance under the  lead , simply wipe/clean it off with a washcloth or paper towel.      Q:  What can I do to minimize constipation (very hard stools, or lack of stools)?  A:  Stay well hydrated.  Increase your dietary fiber intake or take a fiber supplement -with plenty of water.  Walk around frequently.  You may consider an over-the-counter stool-softener.  Your Pharmacist can assist you with choosing one that is stocked at your pharmacy.  Constipation is also one of the most common side effects of pain medication.  If you are using pain medication, be pro-active and try to PREVENT problems with constipation by taking the steps above BEFORE constipation becomes a problem.    Q:  What do I do if I need more pain medications?  A:  Call the office to receive refills.  Be aware that certain pain meds cannot be called into a pharmacy and actually require a paper prescription.  A change may be made in your pain med as you progress thru your recovery period or if you have side effects to certain meds.    --Pain meds are NOT refilled after 5pm on weekdays, and NOT AT ALL on the weekends, so please look ahead to prevent problems.      Q:  Why am I having a hard time sleeping now that I am at home?  A:  Many medications you receive while you are in the hospital can impact your sleep for a number of days after your surgery/hospitalization.  Decreased level of activity and naps during the day may also make sleeping at night difficult.  Try to minimize day-time naps, and get up frequently during the day to walk around your home during your recovery time.  Sleep aides may be of some help, but are not  recommended for long-term use.      Q:  I am having some back discomfort.  What should I do?  A:  This may be related to certain positioning that was required for your surgery, extended periods of time in bed, or other changes in your overall activity level.  You may try ice, heat, acetaminophen, or ibuprofen to treat this temporarily.  Note that many pain medications have acetaminophen in them and would state this on the prescription bottle.  Be sure not to exceed the maximum of 4000mg per day of acetaminophen.     **If the pain you are having does not resolve, is severe, or is a flare of back pain you have had on other occasions prior to surgery, please contact your primary physician for further recommendations or for an appointment to be examined at their office.    Q:  Why am I having headaches?  A:  Headaches can be caused by many things:  caffeine withdrawal, use of pain meds, dehydration, high blood pressure, lack of sleep, over-activity/exhaustion, flare-up of usual migraine headaches.  If you feel this is related to muscle tension (a band-like feeling around the head, or a pressure at the low-back of the head) you may try ice or heat to this area.  You may need to drink more fluids (try electrolyte drink like Gatorade), rest, or take your usual migraine medications.   **If your headaches do not resolve, worsen, are accompanied by other symptoms, or if your blood pressure is high, please call your primary physician for recommendation and/or examination.    Q:  I am unable to urinate.  What do I do?  A:  A small percentage of people can have difficulty urinating initially after surgery.  This includes being able to urinate only a very small amount at a time and feeling discomfort or pressure in the very low abdomen.  This is called  urinary retention , and is actually an urgent situation.  Proceed to your nearest Emergency department for evaluation (not an Urgent Care Center).  Sometimes the bladder does not  work correctly after certain medications you receive during surgery, or related to certain procedures.  You may need to have a catheter placed until your bladder recovers.  When planning to go to an Emergency department, it may help to call the ER to let them know you are coming in for this problem after a surgery.  This may help you get in quicker to be evaluated.  **If you have symptoms of a urinary tract infection, please contact your primary physician for the proper evaluation and treatment.    If you have other questions, please call the office Monday thru Friday between 8am and 5pm to discuss with the nurse or physician assistant.  #[phone]    There is a surgeon ON CALL on weekday evenings and over the weekend in case of urgent need only, and may be contacted at the same number.    If you are having an emergency, call 911 or proceed to your nearest emergency department.          GENERAL ANESTHESIA OR SEDATION ADULT DISCHARGE INSTRUCTIONS   SPECIAL PRECAUTIONS FOR 24 HOURS AFTER SURGERY    IT IS NOT UNUSUAL TO FEEL LIGHT-HEADED OR FAINT, UP TO 24 HOURS AFTER SURGERY OR WHILE TAKING PAIN MEDICATION.  IF YOU HAVE THESE SYMPTOMS; SIT FOR A FEW MINUTES BEFORE STANDING AND HAVE SOMEONE ASSIST YOU WHEN YOU GET UP TO WALK OR USE THE BATHROOM.    YOU SHOULD REST AND RELAX FOR THE NEXT 24 HOURS AND YOU MUST MAKE ARRANGEMENTS TO HAVE SOMEONE STAY WITH YOU FOR AT LEAST 24 HOURS AFTER YOUR DISCHARGE.  AVOID HAZARDOUS AND STRENUOUS ACTIVITIES.  DO NOT MAKE IMPORTANT DECISIONS FOR 24 HOURS.    DO NOT DRIVE ANY VEHICLE OR OPERATE MECHANICAL EQUIPMENT FOR 24 HOURS FOLLOWING THE END OF YOUR SURGERY.  EVEN THOUGH YOU MAY FEEL NORMAL, YOUR REACTIONS MAY BE AFFECTED BY THE MEDICATION YOU HAVE RECEIVED.    DO NOT DRINK ALCOHOLIC BEVERAGES FOR 24 HOURS FOLLOWING YOUR SURGERY.    DRINK CLEAR LIQUIDS (APPLE JUICE, GINGER ALE, 7-UP, BROTH, ETC.).  PROGRESS TO YOUR REGULAR DIET AS YOU FEEL ABLE.    YOU MAY HAVE A DRY MOUTH, A  "SORE THROAT, MUSCLES ACHES OR TROUBLE SLEEPING.  THESE SHOULD GO AWAY AFTER 24 HOURS.    CALL YOUR DOCTOR FOR ANY OF THE FOLLOWING:  SIGNS OF INFECTION (FEVER, GROWING TENDERNESS AT THE SURGERY SITE, A LARGE AMOUNT OF DRAINAGE OR BLEEDING, SEVERE PAIN, FOUL-SMELLING DRAINAGE, REDNESS OR SWELLING.    IT HAS BEEN OVER 8 TO 10 HOURS SINCE SURGERY AND YOU ARE STILL NOT ABLE TO URINATE (PASS WATER).             Pending Results     Date and Time Order Name Status Description    10/26/2017 1052 Surgical pathology exam In process             Admission Information     Date & Time Provider Department Dept. Phone    10/26/2017 Jose Sood MD [facility] PreOP/PostOP [phone]      Your Vitals Were     Blood Pressure Temperature Respirations Height Weight Last Period    115/70 (Cuff Size: Adult Large) 98  F (36.7  C) (Temporal) 12 1.626 m (5' 4.02\") 74.6 kg (164 lb 8 oz) 2003    Pulse Oximetry BMI (Body Mass Index)                95% 28.22 kg/m2          [employer] Information     [employer] lets you send messages to your doctor, view your test results, renew your prescriptions, schedule appointments and more. To sign up, go to [email or web address] . Click on \"Log in\" on the left side of the screen, which will take you to the Welcome page. Then click on \"Sign up Now\" on the right side of the page.     You will be asked to enter the access code listed below, as well as some personal information. Please follow the directions to create your username and password.     Your access code is: [email or web address]  Expires: 2017  2:50 PM     Your access code will  in 90 days. If you need help or a new code, please call your [place] clinic or [phone].        Care EveryWhere ID     This is your Care EveryWhere ID. This could be used by other organizations to access your [place] medical records  [id number]        Equal Access to Services     JEANNA GENTILE AH: marquis Ortiz, rema " graciela hernandez lauriejustin spicermigdalia durantaan ah. [facility] [phone].    ATENCIÓN: Si felicity nesbitt, tiene a barton disposición servicios gratuitos de asistencia lingüística. Sonia al [phone].    We comply with applicable federal civil rights laws and [place] laws. We do not discriminate on the basis of race, color, national origin, age, disability, sex, sexual orientation, or gender identity.               Review of your medicines      START taking        Dose / Directions    oxyCODONE 5 MG IR tablet   Commonly known as:  ROXICODONE   Used for:  S/P left breast biopsy        Dose:  5-10 mg   Take 1-2 tablets (5-10 mg) by mouth every 3 hours as needed for pain or other (Moderate to Severe)   Quantity:  30 tablet   Refills:  0         CONTINUE these medicines which have NOT CHANGED        Dose / Directions    aspirin 81 MG tablet   Used for:  Unspecified essential hypertension        ONE DAILY   Quantity:  100   Refills:  3       desonide 0.05 % cream   Commonly known as:  DESOWEN   Used for:  Dermatitis        Apply  topically 2 times daily. May be used on external ears, face and neck.   Quantity:  30 g   Refills:  1       levothyroxine 137 MCG tablet   Commonly known as:  SYNTHROID/LEVOTHROID   Used for:  Other specified hypothyroidism        Dose:  137 mcg   Take 1 tablet (137 mcg) by mouth daily Note dosing change.   Quantity:  90 tablet   Refills:  0       lisinopril 10 MG tablet   Commonly known as:  PRINIVIL/ZESTRIL   Used for:  Hypertension goal BP (blood pressure) < 140/80        Dose:  10 mg   Take 1 tablet (10 mg) by mouth daily   Quantity:  90 tablet   Refills:  0       LORazepam 0.5 MG tablet   Commonly known as:  ATIVAN   Used for:  Pre-op exam        Dose:  0.5-1 mg   Take 1-2 tablets (0.5-1 mg) by mouth nightly as needed for anxiety   Quantity:  10 tablet   Refills:  0       pantoprazole 40 MG EC tablet   Commonly known as:  PROTONIX   Used for:  Esophageal reflux        Dose:  40 mg    Take 1 tablet (40 mg) by mouth daily Take 30-60 minutes before a meal.   Quantity:  90 tablet   Refills:  0       pramipexole 0.125 MG tablet   Commonly known as:  MIRAPEX   Used for:  Restless legs syndrome (RLS)        Dose:  0.375 mg   Take 3 tablets (0.375 mg) by mouth At Bedtime   Quantity:  90 tablet   Refills:  0       RESTASIS 0.05 % ophthalmic emulsion   Generic drug:  cycloSPORINE        Refills:  0       SUMAtriptan 100 MG tablet   Commonly known as:  IMITREX   Used for:  Migraine without status migrainosus, not intractable, unspecified migraine type        Dose:  100 mg   Take 1 tablet (100 mg) by mouth at onset of headache for migraine May repeat in 2 hours if needed.   Quantity:  27 tablet   Refills:  0       terbinafine 250 MG tablet   Commonly known as:  lamISIL   Used for:  Onychomycosis        Dose:  250 mg   Take 1 tablet (250 mg) by mouth daily   Quantity:  42 tablet   Refills:  0       triamcinolone 0.1 % cream   Commonly known as:  KENALOG   Used for:  Progressive pigmentary dermatosis of [facility]        Apply topically 2 times daily   Quantity:  80 g   Refills:  2       verapamil 240 MG CR tablet   Commonly known as:  CALAN-SR   Used for:  Hypertension goal BP (blood pressure) < 140/80        Dose:  240 mg   Take 1 tablet (240 mg) by mouth daily   Quantity:  90 tablet   Refills:  0            Where to get your medicines      Some of these will need a paper prescription and others can be bought over the counter. Ask your nurse if you have questions.     Bring a paper prescription for each of these medications     oxyCODONE 5 MG IR tablet                Protect others around you: Learn how to safely use, store and throw away your medicines at www.disposemymeds.org.             Medication List: This is a list of all your medications and when to take them. Check marks below indicate your daily home schedule. Keep this list as a reference.      Medications           Morning Afternoon Evening  Bedtime As Needed    aspirin 81 MG tablet   ONE DAILY                                desonide 0.05 % cream   Commonly known as:  DESOWEN   Apply  topically 2 times daily. May be used on external ears, face and neck.                                levothyroxine 137 MCG tablet   Commonly known as:  SYNTHROID/LEVOTHROID   Take 1 tablet (137 mcg) by mouth daily Note dosing change.                                lisinopril 10 MG tablet   Commonly known as:  PRINIVIL/ZESTRIL   Take 1 tablet (10 mg) by mouth daily                                LORazepam 0.5 MG tablet   Commonly known as:  ATIVAN   Take 1-2 tablets (0.5-1 mg) by mouth nightly as needed for anxiety                                oxyCODONE 5 MG IR tablet   Commonly known as:  ROXICODONE   Take 1-2 tablets (5-10 mg) by mouth every 3 hours as needed for pain or other (Moderate to Severe)                                pantoprazole 40 MG EC tablet   Commonly known as:  PROTONIX   Take 1 tablet (40 mg) by mouth daily Take 30-60 minutes before a meal.                                pramipexole 0.125 MG tablet   Commonly known as:  MIRAPEX   Take 3 tablets (0.375 mg) by mouth At Bedtime                                RESTASIS 0.05 % ophthalmic emulsion   Generic drug:  cycloSPORINE                                SUMAtriptan 100 MG tablet   Commonly known as:  IMITREX   Take 1 tablet (100 mg) by mouth at onset of headache for migraine May repeat in 2 hours if needed.                                terbinafine 250 MG tablet   Commonly known as:  lamISIL   Take 1 tablet (250 mg) by mouth daily                                triamcinolone 0.1 % cream   Commonly known as:  KENALOG   Apply topically 2 times daily                                verapamil 240 MG CR tablet   Commonly known as:  CALAN-SR   Take 1 tablet (240 mg) by mouth daily

## 2017-10-26 NOTE — DISCHARGE INSTRUCTIONS
HOME CARE FOLLOWING BREAST BIOPSY  FRANKLIN Coronel E. Gavin, N. Guttormson, D. Maurer, SHANTA Mclean, JAROCHO Horvath    RESULTS:  You may call for your final pathology report after 1p.m. two working days after surgery.    INCISIONAL CARE:    If you have a dressing in place, keep clean and dry for 48 hours; you may replace the gauze if it becomes soiled.    After 48 hours you may remove the dressing and shower.  Do not submerse incision in water for 1 week.    If you have a Dermabond dressing (a type of skin glue), you may shower immediately.    Sutures will absorb and do not need to be removed.    If present, leave the steri-strips (white paper tapes) in place for 14 days after surgery.    If present, leave Dermabond glue in place until it wears/flakes off.    You may expect a small amount of drainage from your incision.    A lump/ridge under the incision is normal and will gradually resolve.    SUPPORT:  Wear a bra for support and comfort for 3-7 days, day and night.    ACTIVITY:  Cautiously resume exercise and strenuous activities such as jogging, tennis, aerobics, etc. Also, be careful of stretching activities with operative side for two weeks.    DIET:  No restrictions.  Increased fluid intake is recommended. While taking pain medications, increase dietary fiber or add a fiber supplementation like Metamucil or Citrucel to help prevent constipation - a possible side effect of pain medications.    DISCOMFORT:  Local anesthetic placed at surgery should provide relief for 4-8 hours.  Begin taking pain pills before discomfort is severe.  Take the pain medication with some food, when possible, to minimize side effects.  Intermittent use of ice packs may help during the first 48 hours.  Expect gradual improvement.    RETURN APPOINTMENT:  Schedule a follow-up visit 2-3 weeks post-op.  Office Phone:  [phone]     CONTACT US IF THE FOLLOWING DEVELOPS:   1. A fever that is above 101     2. If there is a  large amount of drainage, bleeding, or swelling.   3. Severe pain that is not relieved by your prescription.   4. Drainage that is thick, cloudy, yellow, green or white.   5. Any other questions not answered by  Frequently Asked Questions  sheet.          FREQUENTLY ASKED QUESTIONS:    Q:  How should my incision look?    A:  Normally your incision will appear slightly swollen with light redness directly along the incision itself as it heals.  It may feel like a bump or ridge as the healing/scarring happens, and over time (3-4 months) this bump or ridge feeling should slowly go away.  In general, clear or pink watery drainage can be normal at first as your incision heals, but should decrease over time.    Q:  How do I know if my incision is infected?  A:  Look at your incision for signs of infection, like redness around the incision spreading to surrounding skin, or drainage of cloudy or foul-smelling drainage.  If you feel warm, check your temperature to see if you are running a fever.    **If any of these things occur, please notify the nurse at our office.  We may need you to come into the office for an incision check.      Q:  How do I take care of my incision?  A:  If you have a dressing in place - Starting the day after surgery, replace the dressing 1-2 times a day until there is no further drainage from the incision.  At that time, a dressing is no longer needed.  Try to minimize tape on the skin if irritation is occurring at the tape sites.  If you have significant irritation from tape on the skin, please call the office to discuss other method of dressing your incision.    Small pieces of tape called  steri-strips  may be present directly overlying your incision; these may be removed 10 days after surgery unless otherwise specified by your surgeon.  If these tapes start to loosen at the ends, you may trim them back until they fall off or are removed.    A:  If you had  Dermabond  tissue glue used as a  dressing (this causes your incision to look shiny with a clear covering over it) - This type of dressing wears off with time and does not require more dressings over the top unless it is draining around the glue as it wears off.  Do not apply ointments or lotions over the incisions until the glue has completely worn off.    Q:  There is a piece of tape or a sticky  lead  still on my skin.  Can I remove this?  A:  Sometimes the sticky  leads  used for monitoring during surgery or for evaluation in the emergency department are not all removed while you are in the hospital.  These sometimes have a tab or metal dot on them.  You can easily remove these on your own, like taking off a band-aid.  If there is a gel substance under the  lead , simply wipe/clean it off with a washcloth or paper towel.      Q:  What can I do to minimize constipation (very hard stools, or lack of stools)?  A:  Stay well hydrated.  Increase your dietary fiber intake or take a fiber supplement -with plenty of water.  Walk around frequently.  You may consider an over-the-counter stool-softener.  Your Pharmacist can assist you with choosing one that is stocked at your pharmacy.  Constipation is also one of the most common side effects of pain medication.  If you are using pain medication, be pro-active and try to PREVENT problems with constipation by taking the steps above BEFORE constipation becomes a problem.    Q:  What do I do if I need more pain medications?  A:  Call the office to receive refills.  Be aware that certain pain meds cannot be called into a pharmacy and actually require a paper prescription.  A change may be made in your pain med as you progress thru your recovery period or if you have side effects to certain meds.    --Pain meds are NOT refilled after 5pm on weekdays, and NOT AT ALL on the weekends, so please look ahead to prevent problems.      Q:  Why am I having a hard time sleeping now that I am at home?  A:  Many  medications you receive while you are in the hospital can impact your sleep for a number of days after your surgery/hospitalization.  Decreased level of activity and naps during the day may also make sleeping at night difficult.  Try to minimize day-time naps, and get up frequently during the day to walk around your home during your recovery time.  Sleep aides may be of some help, but are not recommended for long-term use.      Q:  I am having some back discomfort.  What should I do?  A:  This may be related to certain positioning that was required for your surgery, extended periods of time in bed, or other changes in your overall activity level.  You may try ice, heat, acetaminophen, or ibuprofen to treat this temporarily.  Note that many pain medications have acetaminophen in them and would state this on the prescription bottle.  Be sure not to exceed the maximum of 4000mg per day of acetaminophen.     **If the pain you are having does not resolve, is severe, or is a flare of back pain you have had on other occasions prior to surgery, please contact your primary physician for further recommendations or for an appointment to be examined at their office.    Q:  Why am I having headaches?  A:  Headaches can be caused by many things:  caffeine withdrawal, use of pain meds, dehydration, high blood pressure, lack of sleep, over-activity/exhaustion, flare-up of usual migraine headaches.  If you feel this is related to muscle tension (a band-like feeling around the head, or a pressure at the low-back of the head) you may try ice or heat to this area.  You may need to drink more fluids (try electrolyte drink like Gatorade), rest, or take your usual migraine medications.   **If your headaches do not resolve, worsen, are accompanied by other symptoms, or if your blood pressure is high, please call your primary physician for recommendation and/or examination.    Q:  I am unable to urinate.  What do I do?  A:  A small  percentage of people can have difficulty urinating initially after surgery.  This includes being able to urinate only a very small amount at a time and feeling discomfort or pressure in the very low abdomen.  This is called  urinary retention , and is actually an urgent situation.  Proceed to your nearest Emergency department for evaluation (not an Urgent Care Center).  Sometimes the bladder does not work correctly after certain medications you receive during surgery, or related to certain procedures.  You may need to have a catheter placed until your bladder recovers.  When planning to go to an Emergency department, it may help to call the ER to let them know you are coming in for this problem after a surgery.  This may help you get in quicker to be evaluated.  **If you have symptoms of a urinary tract infection, please contact your primary physician for the proper evaluation and treatment.    If you have other questions, please call the office Monday thru Friday between 8am and 5pm to discuss with the nurse or physician assistant.  #[phone]    There is a surgeon ON CALL on weekday evenings and over the weekend in case of urgent need only, and may be contacted at the same number.    If you are having an emergency, call 911 or proceed to your nearest emergency department.          GENERAL ANESTHESIA OR SEDATION ADULT DISCHARGE INSTRUCTIONS   SPECIAL PRECAUTIONS FOR 24 HOURS AFTER SURGERY    IT IS NOT UNUSUAL TO FEEL LIGHT-HEADED OR FAINT, UP TO 24 HOURS AFTER SURGERY OR WHILE TAKING PAIN MEDICATION.  IF YOU HAVE THESE SYMPTOMS; SIT FOR A FEW MINUTES BEFORE STANDING AND HAVE SOMEONE ASSIST YOU WHEN YOU GET UP TO WALK OR USE THE BATHROOM.    YOU SHOULD REST AND RELAX FOR THE NEXT 24 HOURS AND YOU MUST MAKE ARRANGEMENTS TO HAVE SOMEONE STAY WITH YOU FOR AT LEAST 24 HOURS AFTER YOUR DISCHARGE.  AVOID HAZARDOUS AND STRENUOUS ACTIVITIES.  DO NOT MAKE IMPORTANT DECISIONS FOR 24 HOURS.    DO NOT DRIVE ANY VEHICLE OR  OPERATE MECHANICAL EQUIPMENT FOR 24 HOURS FOLLOWING THE END OF YOUR SURGERY.  EVEN THOUGH YOU MAY FEEL NORMAL, YOUR REACTIONS MAY BE AFFECTED BY THE MEDICATION YOU HAVE RECEIVED.    DO NOT DRINK ALCOHOLIC BEVERAGES FOR 24 HOURS FOLLOWING YOUR SURGERY.    DRINK CLEAR LIQUIDS (APPLE JUICE, GINGER ALE, 7-UP, BROTH, ETC.).  PROGRESS TO YOUR REGULAR DIET AS YOU FEEL ABLE.    YOU MAY HAVE A DRY MOUTH, A SORE THROAT, MUSCLES ACHES OR TROUBLE SLEEPING.  THESE SHOULD GO AWAY AFTER 24 HOURS.    CALL YOUR DOCTOR FOR ANY OF THE FOLLOWING:  SIGNS OF INFECTION (FEVER, GROWING TENDERNESS AT THE SURGERY SITE, A LARGE AMOUNT OF DRAINAGE OR BLEEDING, SEVERE PAIN, FOUL-SMELLING DRAINAGE, REDNESS OR SWELLING.    IT HAS BEEN OVER 8 TO 10 HOURS SINCE SURGERY AND YOU ARE STILL NOT ABLE TO URINATE (PASS WATER).

## 2017-10-26 NOTE — ANESTHESIA CARE TRANSFER NOTE
Patient: China Patterson    Procedure(s):  Left Breast Seed Localized Excisional Biopsy    - Wound Class: I-Clean    Diagnosis: mass  Diagnosis Additional Information: No value filed.    Anesthesia Type:   General     Note:  Airway :Face Mask  Patient transferred to:PACU  Comments: Report and sign off to RN in PACU.  Good resps, skin pink, monitors on, VSS,  O2 via Face Tent.Handoff Report: Identifed the Patient, Identified the Reponsible Provider, Reviewed the pertinent medical history, Discussed the surgical course, Reviewed Intra-OP anesthesia mangement and issues during anesthesia, Set expectations for post-procedure period and Allowed opportunity for questions and acknowledgement of understanding      Vitals: (Last set prior to Anesthesia Care Transfer)    CRNA VITALS  10/26/2017 1045 - 10/26/2017 1131      10/26/2017             Pulse: 76    SpO2: 99 %    Resp Rate (observed): 11    EKG: NSR                Electronically Signed By: MARIAH Billy CRNA  October 26, 2017  11:31 AM

## 2017-10-26 NOTE — PROGRESS NOTES
Spoke with patient after seed localization procedure.  Patient voices feeling good, denies pain or feeling faint.  Education provided regarding home care after procedure.  Reviewed after excisional biopsy teaching sheet .  Patient transported via Nursing support to pre op. Denies concerns or further questions.

## 2017-10-26 NOTE — IP AVS SNAPSHOT
[facility] PreOP/PostOP    [address]    [facility] [address]    Phone:  [phone]    Fax:  [phone]                                       After Visit Summary   10/26/2017    China Patterson    MRN: [id number]           After Visit Summary Signature Page     I have received my discharge instructions, and my questions have been answered. I have discussed any challenges I see with this plan with the nurse or doctor.    ..........................................................................................................................................  Patient/Patient Representative Signature      ..........................................................................................................................................  Patient Representative Print Name and Relationship to Patient    ..................................................               ................................................  Date                                            Time    ..........................................................................................................................................  Reviewed by Signature/Title    ...................................................              ..............................................  Date                                                            Time

## 2017-10-26 NOTE — ANESTHESIA PREPROCEDURE EVALUATION
Anesthesia Evaluation     . Pt has had prior anesthetic. Type: General    No history of anesthetic complications          ROS/MED HX    ENT/Pulmonary:  - neg pulmonary ROS     Neurologic:  - neg neurologic ROS     Cardiovascular:     (+) hypertension----. : . . . :. .       METS/Exercise Tolerance:     Hematologic:  - neg hematologic  ROS       Musculoskeletal:  - neg musculoskeletal ROS       GI/Hepatic:  - neg GI/hepatic ROS       Renal/Genitourinary:  - ROS Renal section negative       Endo:     (+) thyroid problem hypothyroidism, .      Psychiatric:  - neg psychiatric ROS       Infectious Disease:  - neg infectious disease ROS       Malignancy:      - no malignancy   Other:    - neg other ROS                 Physical Exam  Normal systems: cardiovascular, pulmonary and dental    Airway   Mallampati: I  TM distance: >3 FB  Neck ROM: full    Dental     Cardiovascular       Pulmonary                     Anesthesia Plan      History & Physical Review  History and physical reviewed and following examination; no interval change.    ASA Status:  2 .    NPO Status:  > 8 hours    Plan for General (choice of airway) with Intravenous and Propofol induction. Maintenance will be Balanced.    PONV prophylaxis:  Ondansetron (or other 5HT-3) and Dexamethasone or Solumedrol       Postoperative Care  Postoperative pain management:  IV analgesics and Oral pain medications.      Consents  Anesthetic plan, risks, benefits and alternatives discussed with:  Patient or representative and Patient..                          .

## 2017-10-27 ENCOUNTER — TELEPHONE (OUTPATIENT)
Dept: SURGERY | Facility: CLINIC | Age: 67
End: 2017-10-27

## 2017-10-27 NOTE — TELEPHONE ENCOUNTER
GENERAL SURGERY NURSE PHONE TRIAGE   China Patterson  MRN# [id number]  AGE:  67 year old  YOB: 1950  [phone]      Surgeon: Dr. Sood  Surgical Assist:  Lori Saab PA-C     Surgery type: Left seed-localized breast biopsy, upper outer quadrant      Surgery Date: October / 26 / 2017     POD: 1     CHIEF CONCERN:  Pathology results     HISTORY OF PRESENT ILLNESS:   Patient states she was told to call after 1 pm today for pathology results.  Patient notified: pathology is still in process, not completed yet.  Fadia James RN

## 2017-10-30 ENCOUNTER — TELEPHONE (OUTPATIENT)
Dept: SURGERY | Facility: CLINIC | Age: 67
End: 2017-10-30

## 2017-10-30 LAB — COPATH REPORT: NORMAL

## 2017-10-30 NOTE — TELEPHONE ENCOUNTER
Name of caller: Patient    Reason for Call:  Path results     Surgeon:  Dr. Sood    Recent Surgery:  Yes.    If yes, when & what type:    Left seed-localized breast biopsy, upper outer quadrant 10/26/17           Best phone number to reach pt at is: [phone]  Ok to leave a message with medical info? Yes.    Pharmacy preferred (if calling for a refill): N/A

## 2017-11-13 ENCOUNTER — TELEPHONE (OUTPATIENT)
Dept: ONCOLOGY | Facility: CLINIC | Age: 67
End: 2017-11-13

## 2017-11-13 ENCOUNTER — OFFICE VISIT (OUTPATIENT)
Dept: SURGERY | Facility: CLINIC | Age: 67
End: 2017-11-13
Payer: COMMERCIAL

## 2017-11-13 VITALS — HEIGHT: 64 IN | WEIGHT: 164 LBS | BODY MASS INDEX: 28 KG/M2

## 2017-11-13 DIAGNOSIS — Z09 SURGICAL FOLLOWUP VISIT: Primary | ICD-10-CM

## 2017-11-13 DIAGNOSIS — Z80.0 FAMILY HISTORY OF COLON CANCER: Primary | ICD-10-CM

## 2017-11-13 PROCEDURE — 99024 POSTOP FOLLOW-UP VISIT: CPT | Performed by: SURGERY

## 2017-11-13 NOTE — PROGRESS NOTES
She returns in follow-up after seed localized excisional breast biopsy.  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    She reports no problems at the surgical site and she did well with her surgery.    Exam: Incision is healing nicely, there is no sign of infection hematoma or seroma.    Impression: Excellent postoperative recovery status post surgical breast biopsy, hyperplasia with no atypia    Plan: I discussed her completely benign biopsy with her.  There is some hyperplasia (florid) but no atypia.  I think surveillance would be appropriate, rather than chemotherapy prevention however I think it would be appropriate for her to discuss this further with medical oncology.  I contacted their office and they will call the patient for consultation.  She can then make a more informed decision.    Jose Sood MD  11/13/2017 10:14 AM    Please route or send letter to:  Primary Care Provider (PCP) and Include Progress Note

## 2017-11-13 NOTE — MR AVS SNAPSHOT
"              After Visit Summary   2017    China Patterson    MRN: [id number]           Patient Information     Date Of Birth          1950        Visit Information        Provider Department      2017 10:00 AM Jose Sood MD [facility] General Surgery      Today's Diagnoses     Surgical followup visit    -  1       Follow-ups after your visit        Who to contact     If you have questions or need follow up information about today's clinic visit or your schedule please contact [facility] directly at [phone].  Normal or non-critical lab and imaging results will be communicated to you by [place], letter or phone within 4 business days after the clinic has received the results. If you do not hear from us within 7 days, please contact the clinic through [email or web address] or phone. If you have a critical or abnormal lab result, we will notify you by phone as soon as possible.  Submit refill requests through [facility] or call your pharmacy and they will forward the refill request to us. Please allow 3 business days for your refill to be completed.          Additional Information About Your Visit        MyChart Information     Sp[facility]fed lets you send messages to your doctor, view your test results, renew your prescriptions, schedule appointments and more. To sign up, go to [email or web address] . Click on \"Log in\" on the left side of the screen, which will take you to the Welcome page. Then click on \"Sign up Now\" on the right side of the page.     You will be asked to enter the access code listed below, as well as some personal information. Please follow the directions to create your username and password.     Your access code is: [email or web address]  Expires: 2017  1:50 PM     Your access code will  in 90 days. If you need help or a new code, please call your [place] clinic or [phone].        Care EveryWhere ID     This is " "your Care EveryWhere ID. This could be used by other organizations to access your [facility] medical records  [id number]        Your Vitals Were     Height Last Period BMI (Body Mass Index)             5' 4\" (1.626 m) 12/01/2003 28.15 kg/m2          Blood Pressure from Last 3 Encounters:   10/26/17 122/71   10/24/17 104/68   09/06/17 118/80    Weight from Last 3 Encounters:   11/13/17 164 lb (74.4 kg)   10/26/17 164 lb 8 oz (74.6 kg)   10/24/17 163 lb 11.2 oz (74.3 kg)              Today, you had the following     No orders found for display       Primary Care Provider Office Phone # Fax #    [facility] [phone]       [address] [facility] [address]  [facility] [address]        Equal Access to Services     [facility]: Hadii aad ku hadasho Soomaali, waaxda luqadaha, qaybta kaalmada adeegyada, waxay idiin hayaan ademigdalia khararamon rasheed . So [facility] [phone].    ATENCIÓN: Si habla español, tiene a barton disposición servicios gratuitos de asistencia lingüística. Llame al [phone].    We comply with applicable federal civil rights laws and [place] laws. We do not discriminate on the basis of race, color, national origin, age, disability, sex, sexual orientation, or gender identity.            Thank you!     Thank you for choosing [facility]  for your care. Our goal is always to provide you with excellent care. Hearing back from our patients is one way we can continue to improve our services. Please take a few minutes to complete the written survey that you may receive in the mail after your visit with us. Thank you!             Your Updated Medication List - Protect others around you: Learn how to safely use, store and throw away your medicines at www.disposemymeds.org.          This list is accurate as of: 11/13/17 10:16 AM.  Always use your most recent med list.                   Brand Name Dispense Instructions for use Diagnosis    aspirin 81 MG tablet     100    ONE DAILY    " Unspecified essential hypertension       desonide 0.05 % cream    DESOWEN    30 g    Apply  topically 2 times daily. May be used on external ears, face and neck.    Dermatitis       levothyroxine 137 MCG tablet    SYNTHROID/LEVOTHROID    90 tablet    Take 1 tablet (137 mcg) by mouth daily Note dosing change.    Other specified hypothyroidism       lisinopril 10 MG tablet    PRINIVIL/ZESTRIL    90 tablet    Take 1 tablet (10 mg) by mouth daily    Hypertension goal BP (blood pressure) < 140/80       LORazepam 0.5 MG tablet    ATIVAN    10 tablet    Take 1-2 tablets (0.5-1 mg) by mouth nightly as needed for anxiety    Pre-op exam       oxyCODONE IR 5 MG tablet    ROXICODONE    30 tablet    Take 1-2 tablets (5-10 mg) by mouth every 3 hours as needed for pain or other (Moderate to Severe)    S/P left breast biopsy       pantoprazole 40 MG EC tablet    PROTONIX    90 tablet    Take 1 tablet (40 mg) by mouth daily Take 30-60 minutes before a meal.    Esophageal reflux       pramipexole 0.125 MG tablet    MIRAPEX    90 tablet    Take 3 tablets (0.375 mg) by mouth At Bedtime    Restless legs syndrome (RLS)       RESTASIS 0.05 % ophthalmic emulsion   Generic drug:  cycloSPORINE           SUMAtriptan 100 MG tablet    IMITREX    27 tablet    Take 1 tablet (100 mg) by mouth at onset of headache for migraine May repeat in 2 hours if needed.    Migraine without status migrainosus, not intractable, unspecified migraine type       terbinafine 250 MG tablet    lamISIL    42 tablet    Take 1 tablet (250 mg) by mouth daily    Onychomycosis       triamcinolone 0.1 % cream    KENALOG    80 g    Apply topically 2 times daily    Progressive pigmentary dermatosis of [place]       verapamil 240 MG CR tablet    CALAN-SR    90 tablet    Take 1 tablet (240 mg) by mouth daily    Hypertension goal BP (blood pressure) < 140/80

## 2017-11-13 NOTE — TELEPHONE ENCOUNTER
Received call from Dr Sood.  States that he just saw pt in his office. 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  Dr Sood states that pt would like to meet with someone in our office to discuss chemoprevention for breast cancer.  Will discuss with Dr Cook when she is back in the clinic on 11/15 and then call pt back.  Idalia Lopez RN BSN

## 2017-11-13 NOTE — LETTER
2017    Re: China Patterson - 1950    She returns in follow-up after seed localized excisional breast biopsy.  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     She reports no problems at the surgical site and she did well with her surgery.     Exam: Incision is healing nicely, there is no sign of infection hematoma or seroma.     Impression: Excellent postoperative recovery status post surgical breast biopsy, hyperplasia with no atypia     Plan: I discussed her completely benign biopsy with her.  There is some hyperplasia (florid) but no atypia.  I think surveillance would be appropriate, rather than chemotherapy prevention however I think it would be appropriate for her to discuss this further with medical oncology.  I contacted their office and they will call the patient for consultation.  She can then make a more informed decision.     Jose Sood MD

## 2017-11-14 DIAGNOSIS — G25.81 RESTLESS LEGS SYNDROME (RLS): ICD-10-CM

## 2017-11-14 RX ORDER — PRAMIPEXOLE DIHYDROCHLORIDE 0.12 MG/1
0.38 TABLET ORAL AT BEDTIME
Qty: 90 TABLET | Refills: 1 | Status: SHIPPED | OUTPATIENT
Start: 2017-11-14 | End: 2018-01-22

## 2017-11-15 NOTE — TELEPHONE ENCOUNTER
Talked with Dr Cook.  Per Dr Cook, when there is no atypia present, pt does not need to be seen by oncology or by our high risk clinic.  If there is any family history of breast, ovarian, or colon cancer, pt should be seen by genetic counselor. Left a message for pt to call back to discuss.  Idalia Lopez RN BSN

## 2017-11-16 NOTE — TELEPHONE ENCOUNTER
Pt calling back.  Pt notified with recommendations from Dr Cook.  Pt states that she does have a family history of colon cancer, and she would be interested in seeing a genetic counselor.  Staff message has been sent to Sadia Tobias to see if pt could be seen in the oncology clinic even though she is not an established oncology pt.  Idalia Lopez RN BSN

## 2017-11-20 NOTE — TELEPHONE ENCOUNTER
Per Sadia Tobias, genetic counselor, it would be fine for pt to be seen in genetic counseling here in the clinic with Sadia.  Sadia states that we just need to enter a referral to [facility] for genetic counseling.   Referral has been entered.  Will have schedulers contact pt regarding appointment.  Idalia Lopez RN BSN

## 2017-11-20 NOTE — TELEPHONE ENCOUNTER
Pt has been scheduled with Sadia Tobias, genetic counselor, on 1/18/2018.  No further action needed.  Idalia Lopez RN BSN

## 2017-12-18 DIAGNOSIS — K21.9 ESOPHAGEAL REFLUX: ICD-10-CM

## 2017-12-19 RX ORDER — PANTOPRAZOLE SODIUM 40 MG/1
TABLET, DELAYED RELEASE ORAL
Qty: 90 TABLET | Refills: 2 | Status: SHIPPED | OUTPATIENT
Start: 2017-12-19 | End: 2018-11-19

## 2017-12-19 NOTE — TELEPHONE ENCOUNTER
Requested Prescriptions   Pending Prescriptions Disp Refills     pantoprazole (PROTONIX) 40 MG EC tablet [Pharmacy Med Name: PANTOPRAZOLE SOD 40MG TAB] 90 tablet 0     Sig: TAKE ONE TABLET BY MOUTH ONCE DAILY. TAKE 30-60 MINUTES BEFORE A MEAL    PPI Protocol Passed    12/18/2017  5:30 AM       Passed - Not on Clopidogrel (unless Pantoprazole ordered)       Passed - No diagnosis of osteoporosis on record       Passed - Recent or future visit with authorizing provider's specialty    Patient had office visit in the last year or has a visit in the next 30 days with authorizing provider.  See chart review.              Passed - Patient is age 18 or older       Passed - No active pregnacy on record       Passed - No positive pregnancy test in past 12 months        Prescription approved per [facility] according to the [facility] Refill Protocol.

## 2018-01-11 ENCOUNTER — TRANSFERRED RECORDS (OUTPATIENT)
Dept: HEALTH INFORMATION MANAGEMENT | Facility: CLINIC | Age: 68
End: 2018-01-11

## 2018-01-15 DIAGNOSIS — G25.81 RESTLESS LEGS SYNDROME (RLS): ICD-10-CM

## 2018-01-19 RX ORDER — PRAMIPEXOLE DIHYDROCHLORIDE 0.12 MG/1
TABLET ORAL
Qty: 90 TABLET | Refills: 1 | OUTPATIENT
Start: 2018-01-19

## 2018-01-19 NOTE — TELEPHONE ENCOUNTER
"Requested Prescriptions   Pending Prescriptions Disp Refills     pramipexole (MIRAPEX) 0.125 MG tablet [Pharmacy Med Name: PRAMIPEXOLE 0.125MG TAB] 90 tablet 1     Sig: TAKE THREE TABLETS BY MOUTH AT BEDTIME    Antiparkinson's Agents Protocol Passed    1/19/2018  9:58 AM       Passed - Blood pressure under 140/90    BP Readings from Last 3 Encounters:   10/26/17 122/71   10/24/17 104/68   09/06/17 118/80                Passed - Recent or future visit with authorizing provider's specialty    Patient had office visit in the last year or has a visit in the next 30 days with authorizing provider.  See \"Patient Info\" tab in inbasket, or \"Choose Columns\" in Meds & Orders section of the refill encounter.            Passed - Patient is age 18 or older       Passed - No active pregnancy on record       Passed - No positive pregnancy test in the past 12 months      Patient has refills left. Denied.      "

## 2018-01-22 DIAGNOSIS — G25.81 RESTLESS LEGS SYNDROME (RLS): ICD-10-CM

## 2018-01-22 RX ORDER — PRAMIPEXOLE DIHYDROCHLORIDE 0.12 MG/1
0.38 TABLET ORAL AT BEDTIME
Qty: 90 TABLET | Refills: 2 | Status: SHIPPED | OUTPATIENT
Start: 2018-01-22 | End: 2024-09-12

## 2018-01-22 NOTE — TELEPHONE ENCOUNTER
"Received refill request for pramipexole. 90 tablets last 1 month.    Requested Prescriptions   Pending Prescriptions Disp Refills     pramipexole (MIRAPEX) 0.125 MG tablet 90 tablet 1     Sig: Take 3 tablets (0.375 mg) by mouth At Bedtime    Antiparkinson's Agents Protocol Passed    1/22/2018 11:51 AM       Passed - Blood pressure under 140/90    BP Readings from Last 3 Encounters:   10/26/17 122/71   10/24/17 104/68   09/06/17 118/80          Passed - Recent or future visit with authorizing provider's specialty    Patient had office visit in the last year or has a visit in the next 30 days with authorizing provider.  See \"Patient Info\" tab in inbasket, or \"Choose Columns\" in Meds & Orders section of the refill encounter.   Last OV: 10/24/17; last OV discussing RLS: 04/14/17       Passed - Patient is age 18 or older       Passed - No active pregnancy on record       Passed - No positive pregnancy test in the past 12 months      Prescription approved per [facility] Refill Protocol.    "

## 2018-08-19 DIAGNOSIS — I10 HYPERTENSION GOAL BP (BLOOD PRESSURE) < 140/80: ICD-10-CM

## 2018-08-19 DIAGNOSIS — E03.8 OTHER SPECIFIED HYPOTHYROIDISM: ICD-10-CM

## 2018-08-22 RX ORDER — LEVOTHYROXINE SODIUM 137 UG/1
TABLET ORAL
Qty: 90 TABLET | Refills: 0 | OUTPATIENT
Start: 2018-08-22

## 2018-08-22 RX ORDER — LISINOPRIL 10 MG/1
TABLET ORAL
Qty: 90 TABLET | Refills: 0 | OUTPATIENT
Start: 2018-08-22

## 2018-08-22 NOTE — TELEPHONE ENCOUNTER
Contacted patient, she has changed clinics and Gudelia was supposed to send this to go to new PCP  - Dr. Marin. Refill request denied.

## 2018-11-19 DIAGNOSIS — K21.9 GASTROESOPHAGEAL REFLUX DISEASE WITHOUT ESOPHAGITIS: Primary | ICD-10-CM

## 2018-11-19 DIAGNOSIS — K21.9 ESOPHAGEAL REFLUX: ICD-10-CM

## 2018-11-20 NOTE — TELEPHONE ENCOUNTER
"Requested Prescriptions   Pending Prescriptions Disp Refills     pantoprazole (PROTONIX) 40 MG EC tablet [Pharmacy Med Name: PANTOPRAZOLE SOD 40MG TAB]  Last Written Prescription Date:  12/19/17  Last Fill Quantity: 90,  # refills: 2   Last office visit: 10/24/2017 with prescribing provider:  Harvey   Future Office Visit:     90 tablet 2     Sig: TAKE ONE TABLET BY MOUTH ONCE DAILY TAKE  30-60  MINUTES  BEFORE  A  MEAL    PPI Protocol Failed    11/19/2018  8:25 PM       Failed - Recent (12 mo) or future (30 days) visit within the authorizing provider's specialty    Patient had office visit in the last 12 months or has a visit in the next 30 days with authorizing provider or within the authorizing provider's specialty.  See \"Patient Info\" tab in inbasket, or \"Choose Columns\" in Meds & Orders section of the refill encounter.             Passed - Not on Clopidogrel (unless Pantoprazole ordered)       Passed - No diagnosis of osteoporosis on record       Passed - Patient is age 18 or older       Passed - No active pregnacy on record       Passed - No positive pregnancy test in past 12 months          "

## 2018-11-23 RX ORDER — PANTOPRAZOLE SODIUM 40 MG/1
TABLET, DELAYED RELEASE ORAL
Qty: 90 TABLET | Refills: 0 | Status: SHIPPED | OUTPATIENT
Start: 2018-11-23 | End: 2024-09-12

## 2018-11-28 NOTE — TELEPHONE ENCOUNTER
Patient informed of primary care provider's message below.  States she will call back to schedule appointment.

## 2020-06-01 ENCOUNTER — NURSE TRIAGE (OUTPATIENT)
Dept: NURSING | Facility: CLINIC | Age: 70
End: 2020-06-01

## 2020-06-01 ENCOUNTER — HOSPITAL ENCOUNTER (EMERGENCY)
Facility: CLINIC | Age: 70
Discharge: HOME | End: 2020-06-02
Attending: EMERGENCY MEDICINE | Admitting: EMERGENCY MEDICINE
Payer: MEDICARE

## 2020-06-01 DIAGNOSIS — K30 INDIGESTION: ICD-10-CM

## 2020-06-01 DIAGNOSIS — R07.9 CHEST PAIN, UNSPECIFIED TYPE: ICD-10-CM

## 2020-06-01 PROCEDURE — 99285 EMERGENCY DEPT VISIT HI MDM: CPT

## 2020-06-01 PROCEDURE — 93005 ELECTROCARDIOGRAM TRACING: CPT

## 2020-06-01 NOTE — ED AVS SNAPSHOT
[facility] Emergency Department  [address]  [facility] [address]  Phone:  [phone]  Fax:  [phone]                                    China Patterson   MRN: [id number]    Department:  [facility] Emergency Department   Date of Visit:  6/1/2020           After Visit Summary Signature Page    I have received my discharge instructions, and my questions have been answered. I have discussed any challenges I see with this plan with the nurse or doctor.    ..........................................................................................................................................  Patient/Patient Representative Signature      ..........................................................................................................................................  Patient Representative Print Name and Relationship to Patient    ..................................................               ................................................  Date                                   Time    ..........................................................................................................................................  Reviewed by Signature/Title    ...................................................              ..............................................  Date                                               Time          22EPIC Rev 08/18

## 2020-06-02 ENCOUNTER — APPOINTMENT (OUTPATIENT)
Dept: GENERAL RADIOLOGY | Facility: CLINIC | Age: 70
End: 2020-06-02
Attending: EMERGENCY MEDICINE
Payer: MEDICARE

## 2020-06-02 VITALS
BODY MASS INDEX: 26.61 KG/M2 | RESPIRATION RATE: 20 BRPM | DIASTOLIC BLOOD PRESSURE: 77 MMHG | HEART RATE: 67 BPM | OXYGEN SATURATION: 96 % | SYSTOLIC BLOOD PRESSURE: 131 MMHG | TEMPERATURE: 98.1 F | WEIGHT: 155 LBS

## 2020-06-02 LAB
ANION GAP SERPL CALCULATED.3IONS-SCNC: 6 MMOL/L (ref 3–14)
BASOPHILS # BLD AUTO: 0 10E9/L (ref 0–0.2)
BASOPHILS NFR BLD AUTO: 0.1 %
BUN SERPL-MCNC: 19 MG/DL (ref 7–30)
CALCIUM SERPL-MCNC: 9.1 MG/DL (ref 8.5–10.1)
CHLORIDE SERPL-SCNC: 103 MMOL/L (ref 94–109)
CO2 SERPL-SCNC: 29 MMOL/L (ref 20–32)
CREAT SERPL-MCNC: 0.67 MG/DL (ref 0.52–1.04)
DIFFERENTIAL METHOD BLD: ABNORMAL
EOSINOPHIL # BLD AUTO: 0 10E9/L (ref 0–0.7)
EOSINOPHIL NFR BLD AUTO: 0.3 %
ERYTHROCYTE [DISTWIDTH] IN BLOOD BY AUTOMATED COUNT: 14.2 % (ref 10–15)
GFR SERPL CREATININE-BSD FRML MDRD: 88 ML/MIN/{1.73_M2}
GLUCOSE SERPL-MCNC: 92 MG/DL (ref 70–99)
HCT VFR BLD AUTO: 41.7 % (ref 35–47)
HGB BLD-MCNC: 13 G/DL (ref 11.7–15.7)
IMM GRANULOCYTES # BLD: 0 10E9/L (ref 0–0.4)
IMM GRANULOCYTES NFR BLD: 0.4 %
LYMPHOCYTES # BLD AUTO: 3 10E9/L (ref 0.8–5.3)
LYMPHOCYTES NFR BLD AUTO: 27.5 %
MCH RBC QN AUTO: 30 PG (ref 26.5–33)
MCHC RBC AUTO-ENTMCNC: 31.2 G/DL (ref 31.5–36.5)
MCV RBC AUTO: 96 FL (ref 78–100)
MONOCYTES # BLD AUTO: 0.9 10E9/L (ref 0–1.3)
MONOCYTES NFR BLD AUTO: 8.4 %
NEUTROPHILS # BLD AUTO: 6.8 10E9/L (ref 1.6–8.3)
NEUTROPHILS NFR BLD AUTO: 63.3 %
NRBC # BLD AUTO: 0 10*3/UL
NRBC BLD AUTO-RTO: 0 /100
PLATELET # BLD AUTO: 277 10E9/L (ref 150–450)
POTASSIUM SERPL-SCNC: 3.8 MMOL/L (ref 3.4–5.3)
RBC # BLD AUTO: 4.34 10E12/L (ref 3.8–5.2)
SODIUM SERPL-SCNC: 138 MMOL/L (ref 133–144)
TROPONIN I SERPL-MCNC: <0.015 UG/L (ref 0–0.04)
WBC # BLD AUTO: 10.7 10E9/L (ref 4–11)

## 2020-06-02 PROCEDURE — 71046 X-RAY EXAM CHEST 2 VIEWS: CPT

## 2020-06-02 PROCEDURE — 80048 BASIC METABOLIC PNL TOTAL CA: CPT | Performed by: EMERGENCY MEDICINE

## 2020-06-02 PROCEDURE — 85025 COMPLETE CBC W/AUTO DIFF WBC: CPT | Performed by: EMERGENCY MEDICINE

## 2020-06-02 PROCEDURE — 84484 ASSAY OF TROPONIN QUANT: CPT | Performed by: EMERGENCY MEDICINE

## 2020-06-02 PROCEDURE — 25000125 ZZHC RX 250: Performed by: EMERGENCY MEDICINE

## 2020-06-02 PROCEDURE — 25000132 ZZH RX MED GY IP 250 OP 250 PS 637: Mod: GY | Performed by: EMERGENCY MEDICINE

## 2020-06-02 RX ORDER — FAMOTIDINE 20 MG/1
20 TABLET, FILM COATED ORAL 2 TIMES DAILY
Qty: 30 TABLET | Refills: 0 | Status: SHIPPED | OUTPATIENT
Start: 2020-06-02

## 2020-06-02 RX ORDER — NITROGLYCERIN 0.4 MG/1
0.4 TABLET SUBLINGUAL ONCE
Status: DISCONTINUED | OUTPATIENT
Start: 2020-06-02 | End: 2020-06-02 | Stop reason: HOSPADM

## 2020-06-02 RX ADMIN — LIDOCAINE HYDROCHLORIDE 30 ML: 20 SOLUTION ORAL; TOPICAL at 02:10

## 2020-06-02 ASSESSMENT — ENCOUNTER SYMPTOMS
TROUBLE SWALLOWING: 0
ABDOMINAL PAIN: 0
COUGH: 0
BACK PAIN: 1

## 2020-06-02 NOTE — ED PROVIDER NOTES
"  History     Chief Complaint:  Chest Pain      HPI   China Patterson is a 69 year old female with a history of hypertension who presents for the evaluation of chest pain. The patient reports that for the past two days she has been experiencing constant chest pain, prompting her to the ED. The patient describes that her chest pain feels as if she \"swallowed something and it is stuck in [her] throat.\" She states that the pain did not start after eating and that she has not had any problems eating since the pain started. She also describes that her chest pain does not radiate anywhere. The patient denies cough, abdominal pain, trouble swallowing, and other issues.      CARDIAC RISK FACTORS:  Sex:    Female  Tobacco:   No  Hypertension:   Yes  Hyperlipidemia:  No  Diabetes:   No  Family History:  Yes    PE/DVT RISK FACTORS:  Sex:    Female   Hormones:   No  Tobacco:   No  Cancer:   No  Travel:   No  Surgery:   No  Other immobilization: No  Personal history:  No  Family history:  No     Allergies:  No known drug allergies      Medications:    Synthroid  Prinivil   Imitrex   Protonix  Imitrex  Zoloft  Requip   Verapamil    Past Medical History:    Chronic rhinitis  Cryoglobulinemia  Microscopic colitis  Hypertension  Hypothyroidism  Migraines  Anxiety  Varicose veins  Raynaud's syndrome    Past Surgical History:    History reviewed. No pertinent surgical history.     Family History:    Colorectal cancer - father  Heart disease - mother  Alzheimer's disease - mother    Social History:  Smoking status: Never smoker  Alcohol use: Yes  Drug use: No  PCP: [facility]   Marital Status:   [2]     Review of Systems   HENT: Negative for trouble swallowing.    Respiratory: Negative for cough.    Cardiovascular: Positive for chest pain.   Gastrointestinal: Negative for abdominal pain.   Musculoskeletal: Positive for back pain.   All other systems reviewed and are negative.        Physical Exam     Patient Vitals for the " past 24 hrs:   BP Temp Temp src Pulse Heart Rate Resp SpO2 Weight   06/02/20 0100 130/74 -- -- 65 71 -- 97 % --   06/01/20 2346 (!) 165/92 98.1  F (36.7  C) Oral 77 77 20 97 % 70.3 kg (155 lb)      Physical Exam  I have reviewed the triage vital signs    Constitutional: Appears stated age  Eyes: No discharge, symmetrical lids  ENT: Moist mucous membranes, no ear discharge  Neck: Full range of motion  Respiratory: CTAB, no wheezes  Cardiovascular: Regular rate and rhythm, no lower extremity edema  Chest: Equal rise  Gastrointestinal: Soft. Nondistended. NTTP. No rebound or guarding  Musculoskeletal: No gross deformities.   Skin: Warm and well perfused. No visible rash.  Neurologic: Moves all extremities, speech fluent without dysarthria  Psychiatric: Appropriate affect, alert and interactive    Emergency Department Course   ECG (23:53:35):  Rate 72 bpm. AK interval 156. QRS duration 74. QT/QTc 356/389. P-R-T axes 66 52 44. Sinus rhythm with marked sinus arrhythmia. Otherwise normal ECG. Interpreted at 0115 by VoGerardo MD.     Imaging:  Radiographic findings were communicated with the patient who voiced understanding of the findings.  XR Chest 2 Views   IMPRESSION: No focal infiltrate or consolidation. No pneumothorax or pleural fluid. Mild hyperinflation which can be seen with COPD/emphysema. Normal heart size. Normal pulmonary vascularity. Tortuous aorta. Minimal degenerative disc changes in the   spine.  As read by Radiology.    Laboratory:  CBC: WNL (WBC 10.7, HGB 13.0, )  BMP: WNL (Creatinine 0.67)  Troponin (0040): <0.015    Interventions:  Medications   nitroGLYcerin (NITROSTAT) sublingual tablet 0.4 mg (0.4 mg Sublingual Not Given 6/2/20 0145)   lidocaine (XYLOCAINE) 2 % 15 mL, alum & mag hydroxide-simethicone (MAALOX  ES) 15 mL GI Cocktail (30 mLs Oral Given 6/2/20 0210)         Emergency Department Course:  Past medical records, nursing notes, and vitals reviewed.  0110: I performed an exam of  the patient and obtained history, as documented above.     IV inserted and blood drawn.     The patient was sent for a chest x ray while in the emergency department, findings above.    0200: I rechecked the patient. Explained findings to patient.     Findings and plan explained to the Patient. Patient discharged home with instructions regarding supportive care, medications, and reasons to return. The importance of close follow-up was reviewed. The patient was prescribed pepcid.       Impression & Plan    Medical Decision MakinF h/o HTN presenting with chest discomfort  DDx includes but is not limited to ACS, PE, PNA, food bolus  By history, atypical for ACS.  Heart score is 3.  EKG and troponin are nonischemic after >24 hours of symptoms.  Low suspicion for ACS.  Patient is low risk for PE by Wells score.  Score is 0.  Low suspicion for PE.    No fever, no infectious symptoms, doubt pneumonia.  No clinical evidence of heart failure, EKG without characteristic findings, doubt myocarditis or pericarditis.  No risk factors for aortic pathology, patient is comfortable appearing, no characteristic radiation or tearing description, VSS.  Doubt aortic pathology.  Doubt food bolus given no association with food, ability to tolerate PO.  Discussed negative evaluation with patient.  Discussed benign etiologies such as GERD.  Advised follow-up with PCP for stress test as outpatient.  Return to ED precautions were given.     Diagnosis:    ICD-10-CM    1. Chest pain, unspecified type  R07.9    2. Indigestion  K30        Disposition:  Discharged to home.    Discharge Medications:  New Prescriptions    FAMOTIDINE (PEPCID) 20 MG TABLET    Take 1 tablet (20 mg) by mouth 2 times daily     Scribe Disclosure:  Alexandru STUART, am serving as a scribe at 12:51 AM on 2020 to document services personally performed by Gerardo Cortes MD based on my observations and the provider's statements to me.      Alexandru Harper  2020    [facility] EMERGENCY DEPARTMENT       VoGerardo MD  06/02/20 0091

## 2020-06-02 NOTE — ED TRIAGE NOTES
"Pt arrives with what she thinks is indigestion for two days, says it feels like when you swallow something \"and it just feels stuck there\", going into her back as well. No cardiac hx reported. ABCs intact, A/O x4.     "

## 2020-06-02 NOTE — TELEPHONE ENCOUNTER
Has had indigestion where it feels like she can't swallow - feels like it's stuck in her chest. Has been taking tums without relief. Denies pain. Has been able to eat and drink without difficulty. Complains of a tightness - pain of something inside her chest - states it radiates to her back.     Per protocol - advised ED evaluation - will go to [facility].     COVID 19 Nurse Triage Plan/Patient Instructions    Please be aware that novel coronavirus (COVID-19) may be circulating in the community. If you develop symptoms such as fever, cough, or SOB or if you have concerns about the presence of another infection including coronavirus (COVID-19), please contact your health care provider or visit [email or web address].     Disposition/Instructions    Patient to go to ED and follow protocol based instructions. Follow System Ambulatory Workflow for COVID 19.     Bring Your Own Device:  Please also bring your smart device(s) (smart phones, tablets, laptops) and their charging cables for your personal use and to communicate with your care team during your visit.    Thank you for limiting contact with others, wearing a simple mask to cover your cough, practice good hand hygiene habits and accessing our virtual services where possible to limit the spread of this virus.    For more information about COVID19 and options for caring for yourself at home, please visit the CDC website at https://www.cdc.gov/coronavirus/2019-ncov/about/steps-when-sick.html  For more options for care at [facility], please visit our website at [email or web address]    For more information, please use the [place] Department of Health COVID-19 Website: https://www.health.state.[place]/diseases/coronavirus/index.html  [place] Department [facility] ([facility]) COVID-19 Hotlines (Interpreters available):      Health questions: Phone Number: 187.760.7368 or 1-771.752.9321 and Hours: 7 a.m. to 7 p.m.    Schools and  questions:  "Phone Number: 965.643.3428 or 1-595.327.6989 and Hours 7 a.m. to 7 p.m.    Cherrie Randall RN on 6/1/2020 at 11:12 PM    Reason for Disposition    Patient sounds very sick or weak to the triager    Additional Information    Negative: Severe difficulty breathing (e.g., struggling for each breath, speaks in single words)    Negative: Difficult to awaken or acting confused (e.g., disoriented, slurred speech)    Negative: Shock suspected (e.g., cold/pale/clammy skin, too weak to stand, low BP, rapid pulse)    Negative: [1] Chest pain lasts > 5 minutes AND [2] history of heart disease  (i.e., heart attack, bypass surgery, angina, angioplasty, CHF; not just a heart murmur)    Negative: [1] Chest pain lasts > 5 minutes AND [2] described as crushing, pressure-like, or heavy    Negative: [1] Chest pain lasts > 5 minutes AND [2] age > 50    Negative: [1] Chest pain lasts > 5 minutes AND [2] age > 30 AND [3] at least one cardiac risk factor (i.e., hypertension, diabetes, obesity, smoker or strong family history of heart disease)    Negative: [1] Chest pain lasts > 5 minutes AND [2] not relieved with nitroglycerin    Negative: Passed out (i.e., lost consciousness, collapsed and was not responding)    Negative: Heart beating < 50 beats per minute OR > 140 beats per minute    Negative: Visible sweat on face or sweat dripping down face    Negative: Sounds like a life-threatening emergency to the triager    Negative: Followed a chest injury    Negative: SEVERE chest pain    Negative: [1] Intermittent  chest pain or \"angina\" AND [2] increasing in severity or frequency  (Exception: pains lasting a few seconds)    Negative: Pain also present in shoulder(s) or arm(s) or jaw  (Exception: pain is clearly made worse by movement)    Negative: Difficulty breathing    Negative: Dizziness or lightheadedness    Negative: Coughing up blood    Negative: Cocaine use within last 3 days    Negative: History of prior \"blood clot\" in leg or lungs (i.e., " deep vein thrombosis, pulmonary embolism)    Negative: Recent illness requiring prolonged bedrest (i.e., immobilization)    Negative: Hip or leg fracture in past 2 months (e.g., had cast on leg or ankle)    Negative: Major surgery in the past month    Negative: Recent long-distance travel with prolonged time in car, bus, plane, or train (i.e., within past 2 weeks; 6 or  more hours duration)    Negative: Chest pain lasts > 5 minutes (Exceptions: chest pain occurring > 3 days ago and now asymptomatic; same as previously diagnosed heartburn and has accompanying sour taste in mouth)    Negative: Taking a deep breath makes pain worse    Protocols used: CHEST PAIN-A-AH

## 2020-06-03 LAB — INTERPRETATION ECG - MUSE: NORMAL

## 2020-09-03 ENCOUNTER — HOSPITAL ENCOUNTER (OUTPATIENT)
Dept: MAMMOGRAPHY | Facility: CLINIC | Age: 70
End: 2020-09-03
Attending: PHYSICIAN ASSISTANT
Payer: MEDICARE

## 2020-09-03 DIAGNOSIS — N64.4 MASTODYNIA: ICD-10-CM

## 2020-09-03 PROCEDURE — 77066 DX MAMMO INCL CAD BI: CPT

## 2021-09-22 RX ORDER — LEVOCETIRIZINE DIHYDROCHLORIDE 5 MG/1
TABLET, FILM COATED ORAL
Qty: 30 TABLET | Refills: 0 | OUTPATIENT
Start: 2021-09-22

## 2022-12-06 ENCOUNTER — TRANSCRIBE ORDERS (OUTPATIENT)
Dept: OTHER | Age: 72
End: 2022-12-06

## 2022-12-06 ENCOUNTER — THERAPY VISIT (OUTPATIENT)
Dept: PHYSICAL THERAPY | Facility: CLINIC | Age: 72
End: 2022-12-06
Payer: MEDICARE

## 2022-12-06 DIAGNOSIS — M25.561 ACUTE PAIN OF RIGHT KNEE: ICD-10-CM

## 2022-12-06 DIAGNOSIS — Z96.659 KNEE JOINT REPLACEMENT STATUS: Primary | ICD-10-CM

## 2022-12-06 DIAGNOSIS — Z96.659 KNEE JOINT REPLACEMENT STATUS: ICD-10-CM

## 2022-12-06 PROCEDURE — 97161 PT EVAL LOW COMPLEX 20 MIN: CPT | Mod: GP | Performed by: PHYSICAL THERAPIST

## 2022-12-06 PROCEDURE — 97140 MANUAL THERAPY 1/> REGIONS: CPT | Mod: GP | Performed by: PHYSICAL THERAPIST

## 2022-12-06 PROCEDURE — 97110 THERAPEUTIC EXERCISES: CPT | Mod: GP | Performed by: PHYSICAL THERAPIST

## 2022-12-06 ASSESSMENT — ACTIVITIES OF DAILY LIVING (ADL)
GO UP STAIRS: ACTIVITY IS VERY DIFFICULT
RAW_SCORE: 18
LIMPING: THE SYMPTOM PREVENTS ME FROM ALL DAILY ACTIVITIES
KNEEL ON THE FRONT OF YOUR KNEE: I AM UNABLE TO DO THE ACTIVITY
WEAKNESS: THE SYMPTOM PREVENTS ME FROM ALL DAILY ACTIVITIES
HOW_WOULD_YOU_RATE_THE_CURRENT_FUNCTION_OF_YOUR_KNEE_DURING_YOUR_USUAL_DAILY_ACTIVITIES_ON_A_SCALE_FROM_0_TO_100_WITH_100_BEING_YOUR_LEVEL_OF_KNEE_FUNCTION_PRIOR_TO_YOUR_INJURY_AND_0_BEING_THE_INABILITY_TO_PERFORM_ANY_OF_YOUR_USUAL_DAILY_ACTIVITIES?: 25
GIVING WAY, BUCKLING OR SHIFTING OF KNEE: I HAVE THE SYMPTOM BUT IT DOES NOT AFFECT MY ACTIVITY
KNEE_ACTIVITY_OF_DAILY_LIVING_SUM: 18
PAIN: THE SYMPTOM AFFECTS MY ACTIVITY SEVERELY
RISE FROM A CHAIR: ACTIVITY IS FAIRLY DIFFICULT
AS_A_RESULT_OF_YOUR_KNEE_INJURY,_HOW_WOULD_YOU_RATE_YOUR_CURRENT_LEVEL_OF_DAILY_ACTIVITY?: SEVERELY ABNORMAL
SWELLING: THE SYMPTOM AFFECTS MY ACTIVITY MODERATELY
SIT WITH YOUR KNEE BENT: ACTIVITY IS FAIRLY DIFFICULT
HOW_WOULD_YOU_RATE_THE_OVERALL_FUNCTION_OF_YOUR_KNEE_DURING_YOUR_USUAL_DAILY_ACTIVITIES?: SEVERELY ABNORMAL
KNEE_ACTIVITY_OF_DAILY_LIVING_SCORE: 25.71
WALK: ACTIVITY IS VERY DIFFICULT
STIFFNESS: THE SYMPTOM AFFECTS MY ACTIVITY SEVERELY
SQUAT: I AM UNABLE TO DO THE ACTIVITY
GO DOWN STAIRS: ACTIVITY IS VERY DIFFICULT
STAND: ACTIVITY IS SOMEWHAT DIFFICULT

## 2022-12-06 NOTE — PROGRESS NOTES
Physical Therapy Initial Evaluation  Subjective:  The history is provided by the patient. No  was used.   Patient Health History  China Patterson being seen for s/p right TKA.     Problem began: 10/11/2022.   Problem occurred: post op right TKA   Pain is reported as 4/10 on pain scale.  General health as reported by patient is good.  Pertinent medical history includes: high blood pressure and thyroid problems.     Medical allergies: none.   Surgeries include:  Orthopedic surgery. Other surgery history details: right TKA 10/11/22; left TKA 1 year ago.    Current medications:  Thyroid medication and high blood pressure medication.    Current occupation is retired.                     Therapist Generated HPI Evaluation  Problem details: Pt had right TKA 10/11/22, had some complications after so did get started a bit later with PT. Had left knee TKA a year ago and that went really well. [place], so will be leaving 1/2/23.Reports increased pain in right knee. Has been going to therapy, but feels like they have been trying to push it too hard, and it is just sore all the time. Has had increased swelling also. Very stiff in morning, and after sitting or any prolonged position,then stiff when gets up again. Had surgery at [facility] , last saw MD 11/28/22, labs and xrays, and surgeon reports everything looks good. Did get a compression sleeve that runs from foot to thigh, did not wear today, just has light compression sock that goes over knee. Frustrated overall by lack of progress and level of pain. Last therapy visit was last week, did have another therapist that did some more manual work and that seemed to help. .         Type of problem:  Right knee.    This is a new condition.  Condition occurred with:  Other reason (after surgery).  Where condition occurred: other.  Patient reports pain:  In the joint, lateral, anterior and medial.  Pain is described as aching and sharp and is  constant.  Pain is the same all the time.  Since onset symptoms are gradually improving.  Associated symptoms:  Loss of motion/stiffness and loss of strength. Symptoms are exacerbated by activity, certain positions, walking, descending stairs, ascending stairs, bending/squatting and standing  and relieved by ice and rest.    Previous treatment includes physical therapy.   Work activity restrictions: retired.  Barriers include:  None as reported by patient.                        Objective:  System                                                Knee Evaluation:  ROM:    AROM    Hyperextension: Left:     Right: 0  Extension: Left:    Right:  10  Flexion: Left:   Right: 100    Pain: end range pain with flexion and extension    Strength:     Extension:  Left: 5/5   Pain:      Right: 4-/5   Pain:  Flexion:  Left: 5/5   Pain:      Right: 4/5   Pain:    Quad Set Left: Good    Pain:   Quad Set Right: Fair    Pain:        Edema:  Edema of the knee: moderate edema right knee; scar is well healed.                General Evaluation:                              Gait:    Significant findings:  Ambulates with SE with limp on right. in stance phase right knee is in flexion, not able to fully extend.                                      ROS    Assessment/Plan:    Patient is a 72 year old female with right side knee complaints.    Patient has the following significant findings with corresponding treatment plan.                Diagnosis 1:  S/p right TKA  Pain -  hot/cold therapy, electric stimulation, manual therapy and home program  Decreased ROM/flexibility - manual therapy, therapeutic exercise and home program  Decreased joint mobility - manual therapy, therapeutic exercise and home program  Decreased strength - therapeutic exercise, therapeutic activities and home program  Impaired balance - neuro re-education, therapeutic activities and home program  Impaired gait - gait training and home program    Therapy Evaluation Codes:    1) Clinical presentation characteristics are:   Stable/Uncomplicated.  2) Decision-Making    Low complexity using standardized patient assessment instrument and/or measureable assessment of functional outcome.  Cumulative Therapy Evaluation is: Low complexity.    Previous and current functional limitations:  (See Goal Flow Sheet for this information)    Short term and Long term goals: (See Goal Flow Sheet for this information)     Communication ability:  Patient appears to be able to clearly communicate and understand verbal and written communication and follow directions correctly.  Treatment Explanation - The following has been discussed with the patient:   RX ordered/plan of care  Anticipated outcomes  Possible risks and side effects  This patient would benefit from PT intervention to resume normal activities.   Rehab potential is good.    Frequency:  2 X week, once daily  Duration:  for 8 weeks  Discharge Plan:  Achieve all LTG.  Independent in home treatment program.  Reach maximal therapeutic benefit.    Please refer to the daily flowsheet for treatment today, total treatment time and time spent performing 1:1 timed codes.

## 2022-12-06 NOTE — PROGRESS NOTES
[facility]    OUTPATIENT Physical Therapy ORTHOPEDIC EVALUATION  PLAN OF TREATMENT FOR OUTPATIENT REHABILITATION  (COMPLETE FOR INITIAL CLAIMS ONLY)  Patient's Last Name, First Name, M.I.  YOB: 1950  China Patterson    Provider s Name:  [facility]   Medical Record No.  [id number]   Start of Care Date:  12/06/22   Onset Date:  10/11/2210/11/22   Treatment Diagnosis:  s/p right TKA Medical Diagnosis:     Knee joint replacement status  Acute pain of right knee       Goals:     12/06/22 0500   Body Part   Goals listed below are for right knee   Goal #1   Goal #1 ambulation   Previous Functional Level No restrictions   Current Functional Level Minutes patient can walk;with cane;Ambulates with gait deviation of    Performance Level 10 min, limp on right,unable to fully extend in standing pain 4/10   STG Target Performance Minutes patient will be able to walk;with cane;Ambulate without gait deviation   Performance Level 15 min; good gait pattern with knee extension in stance phase, pain 2/10   Rationale for safe household ambulation;for safe outdoor household ambulation;for safe community ambulation;to maintain proper body mechanics/posture while ambulating to avoid additional compensatory injury due to improper gait mechanics;to promote a healthy and active lifestyle   Due Date 01/03/23    LTG Target Performance Minutes patient will be able to  walk;on uneven terrain; on sharp inclines/declines;Ambulate without gait deviation   Performance Level 30 min   Rationale for safe household ambulation;for safe outdoor household ambulation;for safe community ambulation;to maintain proper body mechanics/posture while ambulating to avoid additional compensatory injury due to improper gait mechanics;to promote a healthy and active lifestyle   Due Date 01/31/23   Goal #2   Goal #2  stairs   Previous Functional Level No restrictions   Current Functional Level one step at a time;Ascend/descend stairs;with a railing   Performance Level pain 4/10 and unable to come over top of knee with descending   STG Target Performance Ascend stairs;in a normal reciprocal pattern;with a railing   Performance Level pain 2/10   Rationale to enter/leave the house safely;to reach upper level of home safely;to reach lower level of home safely;for safe community access to buildings;for safe community ambulaton   Due Date 01/03/23   LTG Target Performance Ascend/descend stairs;in a normal reciprocal pattern;without a railing   Performance Level pain 1/10   Rationale to enter/leave the house safely;to reach upper level of home safely;to reach lower level of home safely;for safe community access to buildings;for safe community ambulaton   Due Date 01/31/23       Therapy Frequency:  2x/week  Predicted Duration of Therapy Intervention:  8 weeks    Micaela Monique, PT                 I CERTIFY THE NEED FOR THESE SERVICES FURNISHED UNDER        THIS PLAN OF TREATMENT AND WHILE UNDER MY CARE .             Physician Signature               Date    X_____________________________________________________                         Certification Date From:  12/06/22   Certification Date To:  01/31/23    Referring Provider:  Amor Cuellar    Initial Assessment        See Epic Evaluation SOC Date: 12/06/22

## 2022-12-08 ENCOUNTER — THERAPY VISIT (OUTPATIENT)
Dept: PHYSICAL THERAPY | Facility: CLINIC | Age: 72
End: 2022-12-08
Payer: MEDICARE

## 2022-12-08 DIAGNOSIS — M25.561 ACUTE PAIN OF RIGHT KNEE: Primary | ICD-10-CM

## 2022-12-08 PROCEDURE — 97110 THERAPEUTIC EXERCISES: CPT | Mod: GP | Performed by: PHYSICAL THERAPIST

## 2022-12-08 PROCEDURE — 97140 MANUAL THERAPY 1/> REGIONS: CPT | Mod: GP | Performed by: PHYSICAL THERAPIST

## 2022-12-08 PROCEDURE — 97112 NEUROMUSCULAR REEDUCATION: CPT | Mod: GP | Performed by: PHYSICAL THERAPIST

## 2022-12-13 ENCOUNTER — THERAPY VISIT (OUTPATIENT)
Dept: PHYSICAL THERAPY | Facility: CLINIC | Age: 72
End: 2022-12-13
Payer: MEDICARE

## 2022-12-13 DIAGNOSIS — M25.561 ACUTE PAIN OF RIGHT KNEE: Primary | ICD-10-CM

## 2022-12-13 PROCEDURE — 97140 MANUAL THERAPY 1/> REGIONS: CPT | Mod: GP | Performed by: PHYSICAL THERAPIST

## 2022-12-13 PROCEDURE — 97110 THERAPEUTIC EXERCISES: CPT | Mod: GP | Performed by: PHYSICAL THERAPIST

## 2022-12-13 PROCEDURE — 97112 NEUROMUSCULAR REEDUCATION: CPT | Mod: GP | Performed by: PHYSICAL THERAPIST

## 2022-12-20 ENCOUNTER — THERAPY VISIT (OUTPATIENT)
Dept: PHYSICAL THERAPY | Facility: CLINIC | Age: 72
End: 2022-12-20
Payer: MEDICARE

## 2022-12-20 DIAGNOSIS — M25.561 ACUTE PAIN OF RIGHT KNEE: Primary | ICD-10-CM

## 2022-12-20 PROCEDURE — 97140 MANUAL THERAPY 1/> REGIONS: CPT | Mod: GP | Performed by: PHYSICAL THERAPIST

## 2022-12-20 PROCEDURE — 97112 NEUROMUSCULAR REEDUCATION: CPT | Mod: GP | Performed by: PHYSICAL THERAPIST

## 2022-12-20 PROCEDURE — 97110 THERAPEUTIC EXERCISES: CPT | Mod: GP | Performed by: PHYSICAL THERAPIST

## 2022-12-22 ENCOUNTER — THERAPY VISIT (OUTPATIENT)
Dept: PHYSICAL THERAPY | Facility: CLINIC | Age: 72
End: 2022-12-22
Payer: MEDICARE

## 2022-12-22 DIAGNOSIS — M25.561 ACUTE PAIN OF RIGHT KNEE: Primary | ICD-10-CM

## 2022-12-22 PROCEDURE — 97140 MANUAL THERAPY 1/> REGIONS: CPT | Mod: GP | Performed by: PHYSICAL THERAPIST

## 2022-12-22 PROCEDURE — 97110 THERAPEUTIC EXERCISES: CPT | Mod: GP | Performed by: PHYSICAL THERAPIST

## 2022-12-26 ENCOUNTER — THERAPY VISIT (OUTPATIENT)
Dept: PHYSICAL THERAPY | Facility: CLINIC | Age: 72
End: 2022-12-26
Payer: MEDICARE

## 2022-12-26 DIAGNOSIS — M25.561 ACUTE PAIN OF RIGHT KNEE: Primary | ICD-10-CM

## 2022-12-26 PROCEDURE — 97110 THERAPEUTIC EXERCISES: CPT | Mod: GP | Performed by: PHYSICAL THERAPIST

## 2022-12-26 PROCEDURE — 97140 MANUAL THERAPY 1/> REGIONS: CPT | Mod: GP | Performed by: PHYSICAL THERAPIST

## 2022-12-27 ENCOUNTER — THERAPY VISIT (OUTPATIENT)
Dept: PHYSICAL THERAPY | Facility: CLINIC | Age: 72
End: 2022-12-27
Payer: MEDICARE

## 2022-12-27 DIAGNOSIS — M25.561 ACUTE PAIN OF RIGHT KNEE: Primary | ICD-10-CM

## 2022-12-27 PROCEDURE — 97110 THERAPEUTIC EXERCISES: CPT | Mod: GP | Performed by: PHYSICAL THERAPIST

## 2022-12-27 PROCEDURE — 97140 MANUAL THERAPY 1/> REGIONS: CPT | Mod: GP | Performed by: PHYSICAL THERAPIST

## 2022-12-27 NOTE — LETTER
[facility]  [address]  [address]  [place] [address]  [phone]    2022    Re: China Patterson   :   1950  MRN:  [id number]   REFERRING PHYSICIAN:   Amor BARAJAS [facility]    Date of Initial Evaluation:  22  Visits:  Rxs Used: 7  Reason for Referral:  Acute pain of right knee    EVALUATION SUMMARY    PROGRESS  REPORT    Progress reporting period is from 2022 to 2022.       SUBJECTIVE  Subjective changes noted by patient:     Has had some lateral knee pain that started over the weekend after having to do more stairs. Bothers with bending activities. She will be leaving for [place] for the winter, and will have access to pool and also stationary bike in gym, so will be able to continue with exercises.    Current Pain level: 4/10.     Initial Pain level: 4/10.   Changes in function:  Yes (See Goal flowsheet attached for changes in current functional level)  Adverse reaction to treatment or activity: None    OBJECTIVE  Changes noted in objective findings:   Objective: Right knee PROM 5-112  Initial visit on 2022 was . Able to do SLR now without quad lag, still some weakness with quad set. Difficulty with descending stairs, still doing one at a time rather than reciprocal. Gait pattern is improving as pt using available range in stance and swing phase of gait. Good reduction in swelling since 22 also. Good compliance with HEP.     ASSESSMENT/PLAN  Updated problem list and treatment plan: Diagnosis 1:  S/p right TKA  Pain -  hot/cold therapy and home program  Decreased ROM/flexibility - manual therapy, therapeutic exercise and home program  Decreased joint mobility - manual therapy, therapeutic exercise and home program  Decreased strength - therapeutic exercise, therapeutic activities and home program  Impaired balance - neuro re-education, therapeutic activities and  home program  Impaired gait - gait training and home program    Re: China Patterson   :   1950      STG/LTGs have been met or progress has been made towards goals:  Yes (See Goal flow sheet completed today.)  Assessment of Progress: The patient's condition is improving.  The patient's condition has potential to improve.  Patient is meeting short term goals and is progressing towards long term goals.  Self Management Plans:  Patient has been instructed in a home treatment program.  Patient  has been instructed in self management of symptoms.  I have re-evaluated this patient and find that the nature, scope, duration and intensity of the therapy is appropriate for the medical condition of the patient.  China continues to require the following intervention to meet STG and LTG's:  Continue with home program.     Recommendations:   Pt is planning on leaving for [place] for the winter next week. She has her home program exercises, and also exercises she can perform in the pool. She will have access to pool and stationary bike also. No further therapy is scheduled here at this time, unless she does not go to [place]. Emphasis placed on continued stretching for flexion and extension, and also improving quad strength. Pt has shown good compliance with her home program.           Thank you for your referral.    INQUIRIES  Therapist: Micaela Monique PT   [address]  [address] [place] [id number]  Phone: [phone]  Fax: [phone]

## 2022-12-27 NOTE — PROGRESS NOTES
PROGRESS  REPORT    Progress reporting period is from 12/6/2022 to 12/27/2022.       SUBJECTIVE  Subjective changes noted by patient:     Has had some lateral knee pain that started over the weekend after having to do more stairs. Bothers with bending activities. She will be leaving for [place] for the winter, and will have access to pool and also stationary bike in gym, so will be able to continue with exercises.    Current Pain level: 4/10.     Initial Pain level: 4/10.   Changes in function:  Yes (See Goal flowsheet attached for changes in current functional level)  Adverse reaction to treatment or activity: None    OBJECTIVE  Changes noted in objective findings:   Objective: Right knee PROM 5-112  Initial visit on 12/6/2022 was . Able to do SLR now without quad lag, still some weakness with quad set. Difficulty with descending stairs, still doing one at a time rather than reciprocal. Gait pattern is improving as pt using available range in stance and swing phase of gait. Good reduction in swelling since 12/6/22 also. Good compliance with HEP.     ASSESSMENT/PLAN  Updated problem list and treatment plan: Diagnosis 1:  S/p right TKA  Pain -  hot/cold therapy and home program  Decreased ROM/flexibility - manual therapy, therapeutic exercise and home program  Decreased joint mobility - manual therapy, therapeutic exercise and home program  Decreased strength - therapeutic exercise, therapeutic activities and home program  Impaired balance - neuro re-education, therapeutic activities and home program  Impaired gait - gait training and home program  STG/LTGs have been met or progress has been made towards goals:  Yes (See Goal flow sheet completed today.)  Assessment of Progress: The patient's condition is improving.  The patient's condition has potential to improve.  Patient is meeting short term goals and is progressing towards long term goals.  Self Management Plans:  Patient has been instructed in a home  treatment program.  Patient  has been instructed in self management of symptoms.  I have re-evaluated this patient and find that the nature, scope, duration and intensity of the therapy is appropriate for the medical condition of the patient.  China continues to require the following intervention to meet STG and LTG's:  Continue with home program.     Recommendations:   Pt is planning on leaving for [place] for the winter next week. She has her home program exercises, and also exercises she can perform in the pool. She will have access to pool and stationary bike also. No further therapy is scheduled here at this time, unless she does not go to [place]. Emphasis placed on continued stretching for flexion and extension, and also improving quad strength. Pt has shown good compliance with her home program.     Please refer to the daily flowsheet for treatment today, total treatment time and time spent performing 1:1 timed codes.

## 2023-01-03 PROBLEM — M25.561 ACUTE PAIN OF RIGHT KNEE: Status: RESOLVED | Noted: 2022-12-06 | Resolved: 2023-01-03

## 2023-01-03 NOTE — PROGRESS NOTES
Patient did not return for further treatment .Pt called on 12/29/2022 and reports she saw physician papo james pleased with progress. She is heading to [place] for the winter and will continue independently with her HEP. Please refer to the progress note and goal flowsheet completed on 12/27/22 for discharge information.

## 2023-12-30 ENCOUNTER — OFFICE VISIT (OUTPATIENT)
Dept: URGENT CARE | Facility: URGENT CARE | Age: 73
End: 2023-12-30
Payer: MEDICARE

## 2023-12-30 VITALS
SYSTOLIC BLOOD PRESSURE: 123 MMHG | RESPIRATION RATE: 16 BRPM | DIASTOLIC BLOOD PRESSURE: 79 MMHG | HEART RATE: 77 BPM | TEMPERATURE: 97.6 F | OXYGEN SATURATION: 100 %

## 2023-12-30 DIAGNOSIS — U07.1 INFECTION DUE TO 2019 NOVEL CORONAVIRUS: Primary | ICD-10-CM

## 2023-12-30 PROCEDURE — 99204 OFFICE O/P NEW MOD 45 MIN: CPT | Performed by: FAMILY MEDICINE

## 2023-12-31 NOTE — PROGRESS NOTES
SUBJECTIVE:   China Patterson is a 73 year old female whose at-home COVID-19 test was positive today.  She is here requesting Paxlovid.    Onset of symptoms was today.   She has been experiencing runny nose, cough, sneezing, sore throat.    Course of illness is still present. .      Her GFR was >90 on June 1, 2023.      Predisposing factors include Her  is positive for COVID-19.  .    Past Medical History:   Diagnosis Date    Chronic rhinitis     Microscopic colitis     Unspecified essential hypertension     Unspecified hypothyroidism     Variants of migraine, not elsewhere classified, without mention of intractable migraine without mention of status migrainosus      Current Outpatient Medications   Medication Sig Dispense Refill    ASPIRIN 81 MG OR TABS ONE DAILY 100 3    famotidine (PEPCID) 20 MG tablet Take 1 tablet (20 mg) by mouth 2 times daily 30 tablet 0    levothyroxine (SYNTHROID/LEVOTHROID) 137 MCG tablet Take 1 tablet (137 mcg) by mouth daily Note dosing change. 90 tablet 0    lisinopril (PRINIVIL/ZESTRIL) 10 MG tablet Take 1 tablet (10 mg) by mouth daily 90 tablet 0    LORazepam (ATIVAN) 0.5 MG tablet Take 1-2 tablets (0.5-1 mg) by mouth nightly as needed for anxiety 10 tablet 0    RESTASIS 0.05 % ophthalmic emulsion       SUMAtriptan (IMITREX) 100 MG tablet TAKE ONE TABLET BY MOUTH AT ONSET OF HEADACHE FOR MIGRAINE. MAY REPEAT IN 2 HOURS IF NEEDED 27 tablet 0    verapamil (CALAN-SR) 240 MG CR tablet Take 1 tablet (240 mg) by mouth daily 90 tablet 0    desonide (DESOWEN) 0.05 % cream Apply  topically 2 times daily. May be used on external ears, face and neck. (Patient not taking: Reported on 12/30/2023) 30 g 1    oxyCODONE (ROXICODONE) 5 MG IR tablet Take 1-2 tablets (5-10 mg) by mouth every 3 hours as needed for pain or other (Moderate to Severe) (Patient not taking: Reported on 11/13/2017) 30 tablet 0    pantoprazole (PROTONIX) 40 MG EC tablet TAKE ONE TABLET BY MOUTH ONCE DAILY TAKE  30-60   MINUTES  BEFORE  A  MEAL (Patient not taking: Reported on 12/30/2023) 90 tablet 0    pramipexole (MIRAPEX) 0.125 MG tablet Take 3 tablets (0.375 mg) by mouth At Bedtime (Patient not taking: Reported on 12/30/2023) 90 tablet 2    terbinafine (LAMISIL) 250 MG tablet Take 1 tablet (250 mg) by mouth daily (Patient not taking: Reported on 12/30/2023) 42 tablet 0    triamcinolone (KENALOG) 0.1 % cream Apply topically 2 times daily (Patient not taking: Reported on 12/30/2023) 80 g 2     Social History     Tobacco Use    Smoking status: Never    Smokeless tobacco: Never   Substance Use Topics    Alcohol use: Yes     Comment: Rare social use       ROS:  CONSTITUTIONAL:negative for fevers.   ENT/MOUTH:  positive for sneezing, sore throat, runny nose  RESP: positive for cough.      OBJECTIVE:  /79   Pulse 77   Temp 97.6  F (36.4  C)   Resp 16   LMP 12/01/2003   SpO2 100%   GENERAL APPEARANCE: alert and no distress.  No respiratory distress is present.    RESP: lungs clear to auscultation - no rales, rhonchi or wheezes  CV: regular rates and rhythm, normal S1 S2, no murmur noted    ASSESSMENT:  Infection due to COVID-19.     PLAN:  Rx; Paxlovid  See orders in [facility]  Patient will not take Verapamil while on the Paxlovid to avoid further lowering of the blood pressure.  There is also a moderate drug-drug interaction with Levothyroxine; there could be decreased effectiveness of the levothyroxine.  She will continue this medication while on the Paxlovid.      Her most recent GFR from June 1, 2023, was normal.      Go to the emergency room if you develop worsening severe shortness of breath.      Alexander Velazquez MD

## 2024-09-12 ENCOUNTER — OFFICE VISIT (OUTPATIENT)
Dept: URGENT CARE | Facility: URGENT CARE | Age: 74
End: 2024-09-12
Payer: MEDICARE

## 2024-09-12 VITALS
OXYGEN SATURATION: 98 % | TEMPERATURE: 98.5 F | SYSTOLIC BLOOD PRESSURE: 110 MMHG | HEART RATE: 81 BPM | DIASTOLIC BLOOD PRESSURE: 76 MMHG

## 2024-09-12 DIAGNOSIS — H61.23 BILATERAL IMPACTED CERUMEN: ICD-10-CM

## 2024-09-12 DIAGNOSIS — H65.91 OME (OTITIS MEDIA WITH EFFUSION), RIGHT: Primary | ICD-10-CM

## 2024-09-12 PROCEDURE — 99213 OFFICE O/P EST LOW 20 MIN: CPT | Performed by: PHYSICIAN ASSISTANT

## 2024-09-12 RX ORDER — AMOXICILLIN 875 MG
875 TABLET ORAL 2 TIMES DAILY
Qty: 20 TABLET | Refills: 0 | Status: SHIPPED | OUTPATIENT
Start: 2024-09-12 | End: 2024-09-22

## 2024-09-12 NOTE — PROGRESS NOTES
SUBJECTIVE:  China Patterson is a 74 year old female who comes in with concerns for her ears feeling plugged.  Patient does have a history of allergies and has felt like her's are plugged for some time.  Does have some mild pain worse on the right.  Denies any significant cough or cold symptoms.  No high fevers, sore throat, GI symptoms or rashes.  Otherwise at baseline health.    Past Medical History:   Diagnosis Date    Chronic rhinitis     Microscopic colitis     Unspecified essential hypertension     Unspecified hypothyroidism     Variants of migraine, not elsewhere classified, without mention of intractable migraine without mention of status migrainosus      Patient Active Problem List   Diagnosis    Hypothyroidism due to acquired atrophy of thyroid    Essential hypertension    Chronic rhinitis    Migraine variant    Family history of colon cancer    CARDIOVASCULAR SCREENING; LDL GOAL LESS THAN 130    Advance care planning    Microscopic colitis    Anxiety    Raynaud's syndrome    Varicose veins of lower extremities with complications    Cryoglobulinemia (H24)    Hypertension goal BP (blood pressure) < 140/80    Restless leg syndrome    Knee joint replacement status     Current Outpatient Medications   Medication Sig Dispense Refill    ASPIRIN 81 MG OR TABS ONE DAILY 100 3    famotidine (PEPCID) 20 MG tablet Take 1 tablet (20 mg) by mouth 2 times daily 30 tablet 0    levothyroxine (SYNTHROID/LEVOTHROID) 137 MCG tablet Take 1 tablet (137 mcg) by mouth daily Note dosing change. 90 tablet 0    lisinopril (PRINIVIL/ZESTRIL) 10 MG tablet Take 1 tablet (10 mg) by mouth daily 90 tablet 0    verapamil (CALAN-SR) 240 MG CR tablet Take 1 tablet (240 mg) by mouth daily 90 tablet 0     No current facility-administered medications for this visit.     Social History     Socioeconomic History    Marital status:      Spouse name: Geovani    Number of children: 2    Years of education: Not on file    Highest education  level: Not on file   Occupational History    Occupation: Internal auditing     Employer: [employer]     Comment: retired    Tobacco Use    Smoking status: Never    Smokeless tobacco: Never   Substance and Sexual Activity    Alcohol use: Yes     Comment: Rare social use    Drug use: No    Sexual activity: Yes     Partners: Male   Other Topics Concern    Parent/sibling w/ CABG, MI or angioplasty before 65F 55M? Not Asked   Social History Narrative    Walks and hikes regularly.      Social Determinants of Health     Financial Resource Strain: Low Risk  (6/1/2023)    [facility]    Financial Resource Strain     Difficulty of Paying Living Expenses: 3     Difficulty of Paying Living Expenses: Not on file   Food Insecurity: No Food Insecurity (6/1/2023)    [facility], [facility]    Food Insecurity     Worried About Running Out of Food in the Last Year: 1   Transportation Needs: No Transportation Needs (6/1/2023)    [facility], [facility]    Transportation Needs     Lack of Transportation (Medical): 1   Physical Activity: Sufficiently Active (9/26/2022)    Received from [facility]    Exercise Vital Sign     Days of Exercise per Week: 5 days     Minutes of Exercise per Session: 30 min   Stress: No Stress Concern Present (9/26/2022)    Received from [facility]    [place] [facility] [employer] [facility] - Occupational Stress Questionnaire     Feeling of Stress : Not at all   Social Connections: Unknown (6/1/2024)    [facility] from [facility]    Social Connections     Frequency of Communication with Friends and Family: Not on file   Interpersonal Safety: Not At Risk (9/26/2022)    [facility] from [facility]    Humiliation, Afraid, Rape, and Kick questionnaire     Fear of Current or Ex-Partner: No     Emotionally Abused: No     Physically Abused: No     Sexually Abused: No   Housing Stability: Low Risk  (6/1/2023)    [facility]    Housing Stability     Unable to Pay for Housing in the Last Year: 1     ROS  negative other than stated above    Exam:  GENERAL APPEARANCE: healthy, alert and no distress  EYES: EOMI,  PERRL  HENT: TMs obstructed with cerumen bilaterally.  Oral mucosa moist with no erythema noted.  NECK: no adenopathy, no asymmetry, masses, or scars and thyroid normal to palpation  RESP: lungs clear to auscultation - no rales, rhonchi or wheezes  CV: regular rates and rhythm, normal S1 S2, no S3 or S4 and no murmur, click or rub -  SKIN: no suspicious lesions or rashes    assessment/plan:  (H65.91) OME (otitis media with effusion), right  (primary encounter diagnosis)  Comment:   Plan: amoxicillin (AMOXIL) 875 MG tablet        Patient with bilateral impacted cerumen in ears feeling plugged.  Ears were flushed with complete removal.  She does have evidence for right-sided otitis media.  Amoxicillin as directed.  Over-the-counter med as needed for symptomatic relief.  Red flag signs were discussed will follow-up as needed    (H61.23) Bilateral impacted cerumen  Comment:   Plan:

## (undated) DEVICE — DRAPE LAP PEDS DISP 29492

## (undated) DEVICE — LINEN FULL SHEET 5511

## (undated) DEVICE — SU VICRYL 4-0 PS-2 18" UND J496H

## (undated) DEVICE — SU VICRYL 3-0 SH 27" J316H

## (undated) DEVICE — PREP SKIN SCRUB TRAY 4461A

## (undated) DEVICE — LINEN HALF SHEET 5512

## (undated) DEVICE — PAD CHUX UNDERPAD 30X36" P3036C

## (undated) DEVICE — SLEEVE PROTECTIVE BREAST BIOPSY  GMSLV001-10

## (undated) DEVICE — PREP SCRUB SOL EXIDINE 4% CHG 4OZ 29002-404

## (undated) DEVICE — GLOVE PROTEXIS W/NEU-THERA 7.5  2D73TE75

## (undated) DEVICE — GLOVE PROTEXIS BLUE W/NEU-THERA 8.0  2D73EB80

## (undated) DEVICE — LINEN TOWEL PACK X10 5473

## (undated) DEVICE — NDL 18GA 1.5" 305196

## (undated) DEVICE — BAG CLEAR TRASH 1.3M 39X33" P4040C

## (undated) DEVICE — GLOVE PROTEXIS BLUE W/NEU-THERA 7.5  2D73EB75

## (undated) DEVICE — CLIP ETHICON LIGACLIP MED WHITE LT200

## (undated) DEVICE — GLOVE PROTEXIS W/NEU-THERA 7.0  2D73TE70

## (undated) DEVICE — PACK MINOR CUSTOM RIDGES SBA32RMRMA

## (undated) DEVICE — SU SILK 3-0 SH 30" K832H

## (undated) DEVICE — SU DERMABOND MINI DHVM12

## (undated) RX ORDER — CEFAZOLIN SODIUM 2 G/100ML
INJECTION, SOLUTION INTRAVENOUS
Status: DISPENSED
Start: 2017-10-26

## (undated) RX ORDER — PROPOFOL 10 MG/ML
INJECTION, EMULSION INTRAVENOUS
Status: DISPENSED
Start: 2017-10-26

## (undated) RX ORDER — ONDANSETRON 2 MG/ML
INJECTION INTRAMUSCULAR; INTRAVENOUS
Status: DISPENSED
Start: 2017-10-26

## (undated) RX ORDER — DEXAMETHASONE SODIUM PHOSPHATE 4 MG/ML
INJECTION, SOLUTION INTRA-ARTICULAR; INTRALESIONAL; INTRAMUSCULAR; INTRAVENOUS; SOFT TISSUE
Status: DISPENSED
Start: 2017-10-26

## (undated) RX ORDER — FENTANYL CITRATE 50 UG/ML
INJECTION, SOLUTION INTRAMUSCULAR; INTRAVENOUS
Status: DISPENSED
Start: 2017-10-26

## (undated) RX ORDER — LIDOCAINE HYDROCHLORIDE 10 MG/ML
INJECTION, SOLUTION EPIDURAL; INFILTRATION; INTRACAUDAL; PERINEURAL
Status: DISPENSED
Start: 2017-10-26

## (undated) RX ORDER — BUPIVACAINE HYDROCHLORIDE AND EPINEPHRINE 2.5; 5 MG/ML; UG/ML
INJECTION, SOLUTION EPIDURAL; INFILTRATION; INTRACAUDAL; PERINEURAL
Status: DISPENSED
Start: 2017-10-26